# Patient Record
(demographics unavailable — no encounter records)

---

## 2019-06-14 NOTE — EDM.PDOC
ED HPI GENERAL MEDICAL PROBLEM





- General


Chief Complaint: General


Stated Complaint: AMBULANCE


Time Seen by Provider: 06/14/19 07:35


Source of Information: Reports: Patient


History Limitations: Reports: No Limitations





- History of Present Illness


INITIAL COMMENTS - FREE TEXT/NARRATIVE: 





This 70 yo female patient was brought to the ED by [facility] due to altered mentation

, tachycardia and shortness of breath. EMS reports the patient was reporting 

that the person in the apartment next to her was having x-rays. The patient 

reports she did start Chantix 2 weeks ago. The patient was having a difficult 

time recalling information she would normally know. 


Onset: Today


Duration: Constant


Location: Reports: Other


Quality: Reports: Other


Severity: Severe


Improves with: Reports: None


Worsens with: Reports: None


Associated Symptoms: Reports: Confusion





- Related Data


 Allergies











Allergy/AdvReac Type Severity Reaction Status Date / Time


 


amoxicillin trihydrate Allergy  Nausea Verified 06/14/19 08:48





[From Augmentin]     


 


potassium clavulanate Allergy  Nausea Verified 06/14/19 08:48





[From Augmentin]     











Home Meds: 


 Home Meds





Amitriptyline [Elavil] 6 tab PO BEDTIME 12/15/14 [History]


Aspirin [Children's Aspirin] 1 tab PO DAILY 12/15/14 [History]


Calcium Citrate/Vitamin D3 [Calcium Citrate with D Tablet] 1 tab PO DAILY 12/15/

14 [History]


Estrogens, Conjugated [Premarin] 0.625 mg PO DAILY 12/15/14 [History]


Levothyroxine Sodium [Synthroid] 112 mcg PO DAILY 12/15/14 [History]


Lisinopril 40 mg PO DAILY 12/15/14 [History]


Meloxicam [Mobic] 1 tab PO DAILY 12/15/14 [History]


Multivitamin [Daily Multiple Vitamin] 1 tab PO DAILY 12/15/14 [History]


Ferrous Sulfate 325 mg PO BIDM 01/14/15 [History]


Lisinopril 20 mg PO DAILY 01/14/15 [History]


Mesalamine [Delzicol] 2 tab PO TID 01/14/15 [History]


Morphine Sulfate 15 mg PO ASDIRECTED 01/14/15 [History]


Pantoprazole [ProTONIX***] 40 mg PO DAILY 01/14/15 [History]


predniSONE 20 mg PO BID 01/14/15 [History]


Dicyclomine [Bentyl] 10 mg PO TID #30 cap 01/16/15 [Rx]


Morphine Sulfate [Morphine Sulfate ER] 2 tab PO BID #1 tablet.er 01/16/15 [Rx]











ED ROS GENERAL





- Review of Systems


Review Of Systems: ROS reveals no pertinent complaints other than HPI.





ED EXAM, GENERAL





- Physical Exam


Exam: See Below


Exam Limited By: No Limitations


General Appearance: Alert, Anxious, Moderate Distress, Thin


Eye Exam: Bilateral Eye: EOMI, Normal Inspection, PERRL


Ears: Normal External Exam, Normal Canal, Hearing Grossly Normal, Normal TMs


Nose: Normal Inspection, Normal Mucosa, No Blood


Throat/Mouth: Normal Inspection, Normal Lips, Normal Teeth, Normal Gums, Normal 

Oropharynx, Normal Voice, No Airway Compromise


Head: Atraumatic, Normocephalic


Neck: Normal Inspection, Supple, Non-Tender, Full Range of Motion


Respiratory/Chest: No Respiratory Distress, Lungs Clear, Normal Breath Sounds, 

No Accessory Muscle Use, Chest Non-Tender


Cardiovascular: No Edema, No Gallop, No JVD, No Murmur, No Rub, Tachycardia


GI/Abdominal: Normal Bowel Sounds, Soft, Non-Tender, No Organomegaly, No 

Distention, No Abnormal Bruit, No Mass


 (Female) Exam: Deferred


Rectal (Female) Exam: Deferred


Back Exam: Normal Inspection, Full Range of Motion, NT


Extremities: Normal Inspection, Normal Range of Motion, Non-Tender, Normal 

Capillary Refill, No Pedal Edema


Neurological: Confused, Disoriented


Psychiatric: Anxious


Skin Exam: Warm, Dry, Intact, Normal Color, No Rash


Lymphatic: No Adenopathy





Course





- Vital Signs


Last Recorded V/S: 


 Last Vital Signs











Temp  36.6 C   06/14/19 07:47


 


Pulse  104 H  06/14/19 07:47


 


Resp  20   06/14/19 07:47


 


BP  129/74   06/14/19 07:47


 


Pulse Ox  90 L  06/14/19 07:47














- Orders/Labs/Meds


Orders: 


 Active Orders 24 hr











 Category Date Time Status


 


 EKG Documentation Completion [RC] URGENT Care  06/14/19 07:15 Active


 


 Sodium Chloride 0.9% [Normal Saline] 1,000 ml Med  06/14/19 08:52 Ordered





 IV .BOLUS   








 Medication Orders





Sodium Chloride (Normal Saline)  1,000 mls @ 500 mls/hr IV .BOLUS ONE


   Stop: 06/14/19 10:51








Labs: 


 Laboratory Tests











  06/14/19 06/14/19 06/14/19 Range/Units





  07:36 07:36 07:36 


 


WBC     (5.0-10.0)  10^3/uL


 


RBC     (4.2-5.4)  10^6/uL


 


Hgb     (12.0-16.0)  g/dL


 


Hct     (37.0-47.0)  %


 


MCV     ()  fL


 


MCH     (27.0-34.0)  pg


 


MCHC     (33.0-35.0)  g/dL


 


Plt Count     (150-450)  10^3/uL


 


Neut % (Auto)     (42.2-75.2)  %


 


Lymph % (Auto)     (20.5-50.1)  %


 


Mono % (Auto)     (2-8)  %


 


Eos % (Auto)     (1.0-3.0)  %


 


Baso % (Auto)     (0.0-1.0)  %


 


Sodium     (135-145)  mmol/L


 


Potassium     (3.6-5.0)  mmol/L


 


Chloride     (101-111)  mmol/L


 


Carbon Dioxide     (21.0-31.0)  mmol/L


 


Anion Gap     


 


BUN     (7-18)  mg/dL


 


Creatinine     (0.6-1.3)  mg/dL


 


Est Cr Clr Drug Dosing     mL/min


 


Estimated GFR (MDRD)     


 


BUN/Creatinine Ratio     


 


Glucose     ()  mg/dL


 


Lactic Acid    1.3  (0.5-2.2)  mmol/L


 


Calcium     (8.4-10.2)  mg/dl


 


Magnesium  2.4    (1.8-2.5)  mg/dL


 


Total Bilirubin     (0.2-1.0)  mg/dL


 


AST     (10-42)  IU/L


 


ALT     (10-60)  IU/L


 


Alkaline Phosphatase     ()  IU/L


 


Ammonia   18   (11-35)  umol/L


 


Troponin I     (0.00-0.02)  ng/ml


 


Total Protein     (6.7-8.2)  g/dl


 


Albumin     (3.2-5.5)  g/dl


 


Globulin     


 


Albumin/Globulin Ratio     


 


Lipase  22    (22-51)  U/L


 


Urine Color     (YELLOW)  


 


Urine Appearance     (CLEAR)  


 


Urine pH     (5.0-9.0)  


 


Ur Specific Gravity     (1.005-1.030)  


 


Urine Protein     (NEGATIVE)  


 


Urine Glucose (UA)     (NEGATIVE)  


 


Urine Ketones     (NEGATIVE)  


 


Urine Occult Blood     (NEGATIVE)  


 


Urine Nitrite     (NEGATIVE)  


 


Urine Bilirubin     (NEGATIVE)  


 


Urine Urobilinogen     (0.2-1.0)  mg/dL


 


Ur Leukocyte Esterase     (NEGATIVE)  


 


Urine RBC     /HPF


 


Urine WBC     (0-5/HPF)  /HPF


 


Ur Epithelial Cells     (NOT SEEN)  /HPF


 


Amorphous Sediment     (NOT SEEN)  /HPF


 


Urine Bacteria     (0-FEW/HPF)  /HPF


 


Hyaline Casts     (NOT SEEN)  /LPF


 


Fine Granular Casts     (NOT SEEN)  /LPF


 


Urine Mucus     (NOT SEEN)  /LPF


 


Salicylates     mg/dL


 


Urine Opiates Screen     (NEGATIVE)  


 


Ur Oxycodone Screen     (NEGATIVE)  


 


Urine Methadone Screen     (NEGATIVE)  


 


Acetaminophen  < 10    ug/mL


 


Ur Barbiturates Screen     (NEGATIVE)  


 


U Tricyclic Antidepress     (NEGATIVE)  


 


Ur Phencyclidine Scrn     (NEGATIVE)  


 


Ur Amphetamine Screen     (NEGATIVE)  


 


U Methamphetamines Scrn     (NEGATIVE)  


 


Urine MDMA Screen     (NEGATIVE)  


 


U Benzodiazepines Scrn     (NEGATIVE)  


 


Urine Cocaine Screen     (NEGATIVE)  


 


U Marijuana (THC) Screen     (NEGATIVE)  


 


Ethyl Alcohol  < 5    mg/dL














  06/14/19 06/14/19 06/14/19 Range/Units





  07:36 07:36 07:58 


 


WBC  18.0 H    (5.0-10.0)  10^3/uL


 


RBC  4.48    (4.2-5.4)  10^6/uL


 


Hgb  14.1  D    (12.0-16.0)  g/dL


 


Hct  42.1    (37.0-47.0)  %


 


MCV  94.0  D    ()  fL


 


MCH  31.5    (27.0-34.0)  pg


 


MCHC  33.5    (33.0-35.0)  g/dL


 


Plt Count  262  D    (150-450)  10^3/uL


 


Neut % (Auto)  87.5 H    (42.2-75.2)  %


 


Lymph % (Auto)  6.5 L    (20.5-50.1)  %


 


Mono % (Auto)  5.8    (2-8)  %


 


Eos % (Auto)  0.1 L    (1.0-3.0)  %


 


Baso % (Auto)  0.1    (0.0-1.0)  %


 


Sodium   129 L   (135-145)  mmol/L


 


Potassium   4.7   (3.6-5.0)  mmol/L


 


Chloride   92 L   (101-111)  mmol/L


 


Carbon Dioxide   24.0   (21.0-31.0)  mmol/L


 


Anion Gap   17.7   


 


BUN   34 H   (7-18)  mg/dL


 


Creatinine   2.3 H   (0.6-1.3)  mg/dL


 


Est Cr Clr Drug Dosing   9.62   mL/min


 


Estimated GFR (MDRD)   21   


 


BUN/Creatinine Ratio   14.78   


 


Glucose   93   ()  mg/dL


 


Lactic Acid     (0.5-2.2)  mmol/L


 


Calcium   9.4   (8.4-10.2)  mg/dl


 


Magnesium     (1.8-2.5)  mg/dL


 


Total Bilirubin   0.7   (0.2-1.0)  mg/dL


 


AST   256 H   (10-42)  IU/L


 


ALT   92 H   (10-60)  IU/L


 


Alkaline Phosphatase   106   ()  IU/L


 


Ammonia     (11-35)  umol/L


 


Troponin I   0.02   (0.00-0.02)  ng/ml


 


Total Protein   6.6 L   (6.7-8.2)  g/dl


 


Albumin   3.4   (3.2-5.5)  g/dl


 


Globulin   3.2   


 


Albumin/Globulin Ratio   1.06   


 


Lipase     (22-51)  U/L


 


Urine Color    Dark yellow  (YELLOW)  


 


Urine Appearance    Slightly cloudy  (CLEAR)  


 


Urine pH    5.0  (5.0-9.0)  


 


Ur Specific Gravity    1.020  (1.005-1.030)  


 


Urine Protein    30 H  (NEGATIVE)  


 


Urine Glucose (UA)    Negative  (NEGATIVE)  


 


Urine Ketones    Trace H  (NEGATIVE)  


 


Urine Occult Blood    Moderate H  (NEGATIVE)  


 


Urine Nitrite    Negative  (NEGATIVE)  


 


Urine Bilirubin    Small H  (NEGATIVE)  


 


Urine Urobilinogen    0.2  (0.2-1.0)  mg/dL


 


Ur Leukocyte Esterase    Negative  (NEGATIVE)  


 


Urine RBC    0-5  /HPF


 


Urine WBC    0-5  (0-5/HPF)  /HPF


 


Ur Epithelial Cells    Moderate H  (NOT SEEN)  /HPF


 


Amorphous Sediment    Occasional  (NOT SEEN)  /HPF


 


Urine Bacteria    Few  (0-FEW/HPF)  /HPF


 


Hyaline Casts    Occasional H  (NOT SEEN)  /LPF


 


Fine Granular Casts    Few H  (NOT SEEN)  /LPF


 


Urine Mucus    Occasional  (NOT SEEN)  /LPF


 


Salicylates   < 4   mg/dL


 


Urine Opiates Screen     (NEGATIVE)  


 


Ur Oxycodone Screen     (NEGATIVE)  


 


Urine Methadone Screen     (NEGATIVE)  


 


Acetaminophen     ug/mL


 


Ur Barbiturates Screen     (NEGATIVE)  


 


U Tricyclic Antidepress     (NEGATIVE)  


 


Ur Phencyclidine Scrn     (NEGATIVE)  


 


Ur Amphetamine Screen     (NEGATIVE)  


 


U Methamphetamines Scrn     (NEGATIVE)  


 


Urine MDMA Screen     (NEGATIVE)  


 


U Benzodiazepines Scrn     (NEGATIVE)  


 


Urine Cocaine Screen     (NEGATIVE)  


 


U Marijuana (THC) Screen     (NEGATIVE)  


 


Ethyl Alcohol     mg/dL














  06/14/19 Range/Units





  07:58 


 


WBC   (5.0-10.0)  10^3/uL


 


RBC   (4.2-5.4)  10^6/uL


 


Hgb   (12.0-16.0)  g/dL


 


Hct   (37.0-47.0)  %


 


MCV   ()  fL


 


MCH   (27.0-34.0)  pg


 


MCHC   (33.0-35.0)  g/dL


 


Plt Count   (150-450)  10^3/uL


 


Neut % (Auto)   (42.2-75.2)  %


 


Lymph % (Auto)   (20.5-50.1)  %


 


Mono % (Auto)   (2-8)  %


 


Eos % (Auto)   (1.0-3.0)  %


 


Baso % (Auto)   (0.0-1.0)  %


 


Sodium   (135-145)  mmol/L


 


Potassium   (3.6-5.0)  mmol/L


 


Chloride   (101-111)  mmol/L


 


Carbon Dioxide   (21.0-31.0)  mmol/L


 


Anion Gap   


 


BUN   (7-18)  mg/dL


 


Creatinine   (0.6-1.3)  mg/dL


 


Est Cr Clr Drug Dosing   mL/min


 


Estimated GFR (MDRD)   


 


BUN/Creatinine Ratio   


 


Glucose   ()  mg/dL


 


Lactic Acid   (0.5-2.2)  mmol/L


 


Calcium   (8.4-10.2)  mg/dl


 


Magnesium   (1.8-2.5)  mg/dL


 


Total Bilirubin   (0.2-1.0)  mg/dL


 


AST   (10-42)  IU/L


 


ALT   (10-60)  IU/L


 


Alkaline Phosphatase   ()  IU/L


 


Ammonia   (11-35)  umol/L


 


Troponin I   (0.00-0.02)  ng/ml


 


Total Protein   (6.7-8.2)  g/dl


 


Albumin   (3.2-5.5)  g/dl


 


Globulin   


 


Albumin/Globulin Ratio   


 


Lipase   (22-51)  U/L


 


Urine Color   (YELLOW)  


 


Urine Appearance   (CLEAR)  


 


Urine pH   (5.0-9.0)  


 


Ur Specific Gravity   (1.005-1.030)  


 


Urine Protein   (NEGATIVE)  


 


Urine Glucose (UA)   (NEGATIVE)  


 


Urine Ketones   (NEGATIVE)  


 


Urine Occult Blood   (NEGATIVE)  


 


Urine Nitrite   (NEGATIVE)  


 


Urine Bilirubin   (NEGATIVE)  


 


Urine Urobilinogen   (0.2-1.0)  mg/dL


 


Ur Leukocyte Esterase   (NEGATIVE)  


 


Urine RBC   /HPF


 


Urine WBC   (0-5/HPF)  /HPF


 


Ur Epithelial Cells   (NOT SEEN)  /HPF


 


Amorphous Sediment   (NOT SEEN)  /HPF


 


Urine Bacteria   (0-FEW/HPF)  /HPF


 


Hyaline Casts   (NOT SEEN)  /LPF


 


Fine Granular Casts   (NOT SEEN)  /LPF


 


Urine Mucus   (NOT SEEN)  /LPF


 


Salicylates   mg/dL


 


Urine Opiates Screen  Positive H  (NEGATIVE)  


 


Ur Oxycodone Screen  Positive H  (NEGATIVE)  


 


Urine Methadone Screen  Negative  (NEGATIVE)  


 


Acetaminophen   ug/mL


 


Ur Barbiturates Screen  Negative  (NEGATIVE)  


 


U Tricyclic Antidepress  Positive H  (NEGATIVE)  


 


Ur Phencyclidine Scrn  Negative  (NEGATIVE)  


 


Ur Amphetamine Screen  Negative  (NEGATIVE)  


 


U Methamphetamines Scrn  Negative  (NEGATIVE)  


 


Urine MDMA Screen  Negative  (NEGATIVE)  


 


U Benzodiazepines Scrn  Negative  (NEGATIVE)  


 


Urine Cocaine Screen  Negative  (NEGATIVE)  


 


U Marijuana (THC) Screen  Negative  (NEGATIVE)  


 


Ethyl Alcohol   mg/dL











Meds: 


Medications











Generic Name Dose Route Start Last Admin





  Trade Name Freq  PRN Reason Stop Dose Admin


 


Sodium Chloride  1,000 mls @ 500 mls/hr  06/14/19 08:52  





  Normal Saline  IV  06/14/19 10:51  





  .BOLUS ONE   





     





     





     





     














Departure





- Departure


Time of Disposition: 08:56


Disposition: DC/Tfer to [facility]


Condition: Serious


Clinical Impression: 


Altered mental status, unspecified


Qualifiers:


 Altered mental status type: unspecified Qualified Code(s): R41.82 - Altered 

mental status, unspecified





Acute renal failure


Qualifiers:


 Acute renal failure type: unspecified Qualified Code(s): N17.9 - Acute kidney 

failure, unspecified








- Discharge Information


*PRESCRIPTION DRUG MONITORING PROGRAM REVIEWED*: Not Applicable


*COPY OF PRESCRIPTION DRUG MONITORING REPORT IN PATIENT ELAINA: Not Applicable


Forms:  Interfacility Transfer EMTALA


Care Plan Goals: 


Discussed the patient's history, examination, lab, EKG, x-ray and CT results 

with Dr. Sarkar (Hospitalist with [facility] in [place]). Dr. Sarkar 

accepted the patient for continued evaluation and further management as an 

inpatient at [facility] in [place]. The patient will be transported by [facility]. 





- My Orders


Last 24 Hours: 


My Active Orders





06/14/19 07:15


EKG Documentation Completion [RC] URGENT 





06/14/19 08:52


Sodium Chloride 0.9% [Normal Saline] 1,000 ml IV .BOLUS 














- Assessment/Plan


Last 24 Hours: 


My Active Orders





06/14/19 07:15


EKG Documentation Completion [RC] URGENT 





06/14/19 08:52


Sodium Chloride 0.9% [Normal Saline] 1,000 ml IV .BOLUS

## 2019-07-02 NOTE — EDM.PDOC
ED HPI GENERAL MEDICAL PROBLEM





- General


Chief Complaint: General


Stated Complaint: [facility]


Time Seen by Provider: 19 10:07


Source of Information: Reports: Patient, EMS, Old Records, RN, RN Notes Reviewed





- History of Present Illness


INITIAL COMMENTS - FREE TEXT/NARRATIVE: 


Pt presents to ER from home by ambulance with c/o 4 or 5 days duration of 

generalized weakness, loss of appetite, mild but recurrent headaches, mild 

shortness of breath, and generalized malaise. Pt states she has eaten nothing 

but small amounts of Ramen noodles for the past 5 days. She denies fevers, 

chills, chest pain, abdominal pain, cough, edema, orthopnea, hemoptysis, 

confusion, visual changes, neck pain or stiffness, joint pain or swelling, sore 

throat, rash, nausea, vomiting, diarrhea, constipation, or dysuria. EMS crew 

reports pt's apartment was "overwhelmingly hot" and smelled heavily of 

cigarette smoke.





Onset: Gradual


Duration: Day(s): (4-5), Constant


Location: Reports: Chest, Generalized


Quality: Reports: Other (Denies pain)


Severity: Severe


Improves with: Reports: None


Worsens with: Reports: None


Associated Symptoms: Reports: No Other Symptoms





- Related Data


 Allergies











Allergy/AdvReac Type Severity Reaction Status Date / Time


 


amoxicillin trihydrate Allergy  Nausea Verified 19 10:25





[From Augmentin]     


 


potassium clavulanate Allergy  Nausea Verified 19 10:25





[From Augmentin]     











Home Meds: 


 Home Meds





Amitriptyline [Elavil] 6 tab PO BEDTIME 12/15/14 [History]


Aspirin [Children's Aspirin] 1 tab PO DAILY 12/15/14 [History]


Calcium Citrate/Vitamin D3 [Calcium Citrate with D Tablet] 1 tab PO DAILY 12/15/

14 [History]


Estrogens, Conjugated [Premarin] 0.625 mg PO DAILY 12/15/14 [History]


Levothyroxine Sodium [Synthroid] 112 mcg PO DAILY 12/15/14 [History]


Lisinopril 40 mg PO DAILY 12/15/14 [History]


Meloxicam [Mobic] 1 tab PO DAILY 12/15/14 [History]


Multivitamin [Daily Multiple Vitamin] 1 tab PO DAILY 12/15/14 [History]


Ferrous Sulfate 325 mg PO BIDM 01/14/15 [History]


Lisinopril 20 mg PO DAILY 01/14/15 [History]


Mesalamine [Delzicol] 2 tab PO TID 01/14/15 [History]


Morphine Sulfate 15 mg PO ASDIRECTED 01/14/15 [History]


Pantoprazole [ProTONIX***] 40 mg PO DAILY 01/14/15 [History]


predniSONE 20 mg PO BID 01/14/15 [History]


Dicyclomine [Bentyl] 10 mg PO TID #30 cap 01/16/15 [Rx]


Morphine Sulfate [Morphine Sulfate ER] 2 tab PO BID #1 tablet.er 01/16/15 [Rx]











Past Medical History


Respiratory History: Reports: COPD, Other (See Below) (Chronic tobacco smoking 

dependence)


Gastrointestinal History: Reports: Cholelithiasis


Other Gastrointestinal History: elevated LFTs


Endocrine/Metabolic History: Reports: Hypothyroidism


Hematologic History: Reports: Anemia, Other (See Below)


Other Hematologic History: iron deficiency anemia





- Past Surgical History


Musculoskeletal Surgical History: Reports: Hip Replacement, Other (See Below)


Other Musculoskeletal Surgeries/Procedures:: rt tka





Social & Family History





- Family History


Family Medical History: Noncontributory





- Tobacco Use


Smoking Status *Q: Current Every Day Smoker


Tobacco Use Within Last Twelve Months: Cigarettes





- Alcohol Use


Alcohol Use History: Yes


Alcohol Use Frequency: Rarely





- Recreational Drug Use


Recreational Drug Use: No





- Living Situation & Occupation


Living situation: Reports: Single, Alone


Occupation: Retired





ED ROS GENERAL





- Review of Systems


Review Of Systems: ROS reveals no pertinent complaints other than HPI.





ED EXAM, GENERAL





- Physical Exam


Exam: See Below


Exam Limited By: No Limitations


General Appearance: Alert, No Apparent Distress, Thin


Eye Exam: Bilateral Eye: EOMI, Normal Inspection, PERRL


Ears: Normal External Exam, Hearing Grossly Normal


Nose: Normal Inspection, Normal Mucosa, No Blood


Throat/Mouth: Normal Lips, Normal Teeth, Normal Gums, Normal Oropharynx, Normal 

Voice, No Airway Compromise, Other (Dry oral membranes)


Head: Atraumatic, Normocephalic


Neck: Normal Inspection, Supple, Non-Tender, Full Range of Motion.  No: 

Lymphadenopathy (L), Lymphadenopathy (R)


Respiratory/Chest: No Respiratory Distress, No Accessory Muscle Use, Chest Non-

Tender, Decreased Breath Sounds, Other (Coarse breath sounds).  No: Rales, 

Rhonchi, Wheezing


Cardiovascular: Regular Rate, Rhythm, No Edema, Tachycardia


GI/Abdominal: Normal Bowel Sounds, Soft, Non-Tender, No Distention, No Abnormal 

Bruit, No Mass.  No: Guarding, Rigid, Rebound


 (Female) Exam: Deferred


Rectal (Female) Exam: Deferred


Back Exam: Normal Inspection, Full Range of Motion.  No: CVA Tenderness (L), 

CVA Tenderness (R)


Extremities: Normal Inspection, Normal Range of Motion, Non-Tender, Normal 

Capillary Refill, No Pedal Edema


Neurological: Alert, Oriented, CN II-XII Intact, Normal Cognition, Normal Gait, 

No Motor/Sensory Deficits


Psychiatric: Depressed Mood, Flat Affect


Skin Exam: Warm, Dry, Intact, Normal Color, No Rash





EKG INTERPRETATION


EKG Date: 19


Time: 10:24


Rhythm: Other (SR)


Rate (Beats/Min): 95


Axis: LAD-Left Axis Deviation


P-Wave: Present


QRS: Other (Q-waves in inferior and lateral leads)


ST-T: Normal


QT: Normal


Comparison: Change From Previous EKG (Q waves not present in lateral leads on 

previous EKG.)





Course





- Vital Signs


Last Recorded V/S: 


 Last Vital Signs











Temp  98.4 F   19 10:25


 


Pulse  100   19 10:25


 


Resp  16   19 10:25


 


BP  127/92 H  19 10:25


 


Pulse Ox  99   19 10:25














- Orders/Labs/Meds


Orders: 


 Active Orders 24 hr











 Category Date Time Status


 


 EKG 12 Lead [EKG Documentation Completion] [RC] STAT Care  19 10:14 

Active











Labs: 


 Laboratory Tests











  19 Range/Units





  10:30 10:30 10:30 


 


WBC  6.3    (5.0-10.0)  10^3/uL


 


RBC  4.52    (4.2-5.4)  10^6/uL


 


Hgb  13.9    (12.0-16.0)  g/dL


 


Hct  40.9    (37.0-47.0)  %


 


MCV  90.5  D    ()  fL


 


MCH  30.8    (27.0-34.0)  pg


 


MCHC  34.0    (33.0-35.0)  g/dL


 


Plt Count  294    (150-450)  10^3/uL


 


Neut % (Auto)  75.4 H    (42.2-75.2)  %


 


Lymph % (Auto)  13.2 L    (20.5-50.1)  %


 


Mono % (Auto)  10.2 H    (2-8)  %


 


Eos % (Auto)  1.0    (1.0-3.0)  %


 


Baso % (Auto)  0.2    (0.0-1.0)  %


 


Sodium   133 L   (135-145)  mmol/L


 


Potassium   3.5 L   (3.6-5.0)  mmol/L


 


Chloride   98 L   (101-111)  mmol/L


 


Carbon Dioxide   25.0   (21.0-31.0)  mmol/L


 


Anion Gap   13.5   


 


BUN   20 H   (7-18)  mg/dL


 


Creatinine   0.9  D   (0.6-1.3)  mg/dL


 


Est Cr Clr Drug Dosing   42.38   mL/min


 


Estimated GFR (MDRD)   > 60   


 


BUN/Creatinine Ratio   22.22   


 


Glucose   107 H   ()  mg/dL


 


Lactic Acid    1.0  (0.5-2.2)  mmol/L


 


Calcium   8.6   (8.4-10.2)  mg/dl


 


Magnesium   1.9   (1.8-2.5)  mg/dL


 


Total Bilirubin   0.8   (0.2-1.0)  mg/dL


 


AST   52 H   (10-42)  IU/L


 


ALT   47   (10-60)  IU/L


 


Alkaline Phosphatase   84   ()  IU/L


 


Troponin I   < 0.02   (0.00-0.02)  ng/ml


 


Total Protein   6.4 L   (6.7-8.2)  g/dl


 


Albumin   3.1 L   (3.2-5.5)  g/dl


 


Globulin   3.3   


 


Albumin/Globulin Ratio   0.94   


 


Amylase   63   ()  U/L


 


Lipase   48   (22-51)  U/L


 


TSH, Ultra Sensitive     (0.45-5.33)  uIu/mL


 


Urine Color     (YELLOW)  


 


Urine Appearance     (CLEAR)  


 


Urine pH     (5.0-9.0)  


 


Ur Specific Gravity     (1.005-1.030)  


 


Urine Protein     (NEGATIVE)  


 


Urine Glucose (UA)     (NEGATIVE)  


 


Urine Ketones     (NEGATIVE)  


 


Urine Occult Blood     (NEGATIVE)  


 


Urine Nitrite     (NEGATIVE)  


 


Urine Bilirubin     (NEGATIVE)  


 


Urine Urobilinogen     (0.2-1.0)  mg/dL


 


Ur Leukocyte Esterase     (NEGATIVE)  


 


Urine Opiates Screen     (NEGATIVE)  


 


Ur Oxycodone Screen     (NEGATIVE)  


 


Urine Methadone Screen     (NEGATIVE)  


 


Ur Barbiturates Screen     (NEGATIVE)  


 


U Tricyclic Antidepress     (NEGATIVE)  


 


Ur Phencyclidine Scrn     (NEGATIVE)  


 


Ur Amphetamine Screen     (NEGATIVE)  


 


U Methamphetamines Scrn     (NEGATIVE)  


 


Urine MDMA Screen     (NEGATIVE)  


 


U Benzodiazepines Scrn     (NEGATIVE)  


 


Urine Cocaine Screen     (NEGATIVE)  


 


U Marijuana (THC) Screen     (NEGATIVE)  


 


Ethyl Alcohol   < 5   mg/dL














  19 Range/Units





  10:30 11:00 11:00 


 


WBC     (5.0-10.0)  10^3/uL


 


RBC     (4.2-5.4)  10^6/uL


 


Hgb     (12.0-16.0)  g/dL


 


Hct     (37.0-47.0)  %


 


MCV     ()  fL


 


MCH     (27.0-34.0)  pg


 


MCHC     (33.0-35.0)  g/dL


 


Plt Count     (150-450)  10^3/uL


 


Neut % (Auto)     (42.2-75.2)  %


 


Lymph % (Auto)     (20.5-50.1)  %


 


Mono % (Auto)     (2-8)  %


 


Eos % (Auto)     (1.0-3.0)  %


 


Baso % (Auto)     (0.0-1.0)  %


 


Sodium     (135-145)  mmol/L


 


Potassium     (3.6-5.0)  mmol/L


 


Chloride     (101-111)  mmol/L


 


Carbon Dioxide     (21.0-31.0)  mmol/L


 


Anion Gap     


 


BUN     (7-18)  mg/dL


 


Creatinine     (0.6-1.3)  mg/dL


 


Est Cr Clr Drug Dosing     mL/min


 


Estimated GFR (MDRD)     


 


BUN/Creatinine Ratio     


 


Glucose     ()  mg/dL


 


Lactic Acid     (0.5-2.2)  mmol/L


 


Calcium     (8.4-10.2)  mg/dl


 


Magnesium     (1.8-2.5)  mg/dL


 


Total Bilirubin     (0.2-1.0)  mg/dL


 


AST     (10-42)  IU/L


 


ALT     (10-60)  IU/L


 


Alkaline Phosphatase     ()  IU/L


 


Troponin I     (0.00-0.02)  ng/ml


 


Total Protein     (6.7-8.2)  g/dl


 


Albumin     (3.2-5.5)  g/dl


 


Globulin     


 


Albumin/Globulin Ratio     


 


Amylase     ()  U/L


 


Lipase     (22-51)  U/L


 


TSH, Ultra Sensitive  1.15    (0.45-5.33)  uIu/mL


 


Urine Color   Yellow   (YELLOW)  


 


Urine Appearance   Clear   (CLEAR)  


 


Urine pH   6.5   (5.0-9.0)  


 


Ur Specific Gravity   1.010   (1.005-1.030)  


 


Urine Protein   Negative   (NEGATIVE)  


 


Urine Glucose (UA)   Negative   (NEGATIVE)  


 


Urine Ketones   Negative   (NEGATIVE)  


 


Urine Occult Blood   Negative   (NEGATIVE)  


 


Urine Nitrite   Negative   (NEGATIVE)  


 


Urine Bilirubin   Negative   (NEGATIVE)  


 


Urine Urobilinogen   0.2   (0.2-1.0)  mg/dL


 


Ur Leukocyte Esterase   Negative   (NEGATIVE)  


 


Urine Opiates Screen    Positive H  (NEGATIVE)  


 


Ur Oxycodone Screen    Negative  (NEGATIVE)  


 


Urine Methadone Screen    Negative  (NEGATIVE)  


 


Ur Barbiturates Screen    Negative  (NEGATIVE)  


 


U Tricyclic Antidepress    Positive H  (NEGATIVE)  


 


Ur Phencyclidine Scrn    Negative  (NEGATIVE)  


 


Ur Amphetamine Screen    Negative  (NEGATIVE)  


 


U Methamphetamines Scrn    Negative  (NEGATIVE)  


 


Urine MDMA Screen    Negative  (NEGATIVE)  


 


U Benzodiazepines Scrn    Negative  (NEGATIVE)  


 


Urine Cocaine Screen    Negative  (NEGATIVE)  


 


U Marijuana (THC) Screen    Negative  (NEGATIVE)  


 


Ethyl Alcohol     mg/dL











Meds: 


Medications














Discontinued Medications














Generic Name Dose Route Start Last Admin





  Trade Name Abiola  PRN Reason Stop Dose Admin


 


Acetaminophen  650 mg  19 10:37  19 10:51





  Tylenol  PO  19 10:38  650 mg





  NOW ONE   Administration





     





     





     





     














- Radiology Interpretation


Free Text/Narrative:: 


[facility]


Final Radiology Report  Call: [phone]


assistance Online chat: [email or web address]


Name: ARMANDO SPAIN Age: 69Years F Date: 2019


MRN: [id number] SSN: -- : [id number]


Study: XR CHEST 2 VIEWS FRONTAL & LAT Requesting Physician: ELENITA KIMBALL


Accession: [id number] Images: 2


Addl Studies:


Provided Clinical History:


Contrast: Contrast Medium:


Contrast Amount: Contrast Method:


CONFIDENTIALITY STATEMENT


This report is intended only for use by the referring physician, and only in 

accordance with law. If you received this in error, call [phone].


Page 1 of 1


EXAM:


XR Chest, 2 Views


EXAM DATE/TIME:


2019 10:40 AM


CLINICAL HISTORY:


69 years old, female; Cough and dyspnea


TECHNIQUE:


Imaging protocol: XR of the chest, 2 views.


COMPARISON:


CR Chest 1V Frontal 2019 7:33 AM


FINDINGS:


Lungs: No focal peripheral lung consolidation, air bronchogram formation, or 

silhouette sign.


Pleural space: No pleural effusion or pneumothorax.


Heart/Mediastinum: The heart is not enlarged.


Vasculature: The thoracic aorta is tortuous.


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 

Prior posterior lumbar


spine fusion surgery.


IMPRESSION:


No pneumonia.


Thank you for allowing us to participate in the care of your patient.


Dictated and Authenticated by: Saturnino Thompson MD


2019 11:16 AM Central Time ([place] & [place])








- Re-Assessments/Exams


Free Text/Narrative Re-Assessment/Exam: 





19 11:49


Pt reports feeling much better after receiving IVF hydration, and wishes to be d

/c'd home.





Departure





- Departure


Time of Disposition: 11:50


Disposition: Home, Self-Care 01


Condition: Good


Clinical Impression: 


 Dehydration





Acute headache


Qualifiers:


 Headache type: unspecified Intractability: not intractable Qualified Code(s): 

R51 - Headache








- Discharge Information


*PRESCRIPTION DRUG MONITORING PROGRAM REVIEWED*: No


*COPY OF PRESCRIPTION DRUG MONITORING REPORT IN PATIENT ELAINA: No


Instructions:  Dehydration, Adult


Forms:  ED Department Discharge


Additional Instructions: 


Keep your house from getting too hot.


Drink plenty of water.


Follow up in clinic if not completely improved in 2 to 3 days.


Return to ER if worse at any time.





- My Orders


Last 24 Hours: 


My Active Orders





19 10:14


EKG 12 Lead [EKG Documentation Completion] [RC] STAT 














- Assessment/Plan


Last 24 Hours: 


My Active Orders





19 10:14


EKG 12 Lead [EKG Documentation Completion] [RC] STAT

## 2020-05-29 NOTE — PCM.HP
H&P History of Present Illness





- General


Date of Service: 05/29/20


Admit Problem/Dx: 


 Admission Diagnosis/Problem





Admission Diagnosis/Problem      Falls








Source of Information: Patient, Provider (ER)





- History of Present Illness


Initial Comments - Free Text/Narative: 





70-year-old lady with a history of hypothyroidism, hypertension.


The patient has had prior hip surgery and has 1 inch discrepancy between lower 

extremity lengths. She also has advanced arthritis.


The patient fell early in the morning on the day of admission. She was unable 

to get up 4-3 hours. She was crawling on the floor. Developed multiple bruises. 

Came to the emergency room.


She denies loss of consciousness, no headache, chest pain, shortness of breath.


She has been complaining of weakness for a few days duration. No new  

medication.





- Related Data


Allergies/Adverse Reactions: 


 Allergies











Allergy/AdvReac Type Severity Reaction Status Date / Time


 


amoxicillin trihydrate Allergy  Nausea Verified 05/29/20 20:08





[From Augmentin]     


 


potassium clavulanate Allergy  Nausea Verified 05/29/20 20:08





[From Augmentin]     


 


codeine AdvReac Mild Vomiting Verified 05/29/20 20:08











Home Medications: 


 Home Meds





Amitriptyline [Elavil] 300 mg PO BEDTIME 12/15/14 [History]


Calcium Citrate/Vitamin D3 [Calcium Citrate with D Tablet] 1 tab PO DAILY 12/15/

14 [History]


Estrogens, Conjugated [Premarin] 0.625 mg PO DAILY 12/15/14 [History]


Levothyroxine Sodium [Synthroid] 112 mcg PO DAILY 12/15/14 [History]


Lisinopril 40 mg PO DAILY 12/15/14 [History]


Multivitamin [Daily Multiple Vitamin] 1 tab PO DAILY 12/15/14 [History]


Lisinopril 20 mg PO DAILY 01/14/15 [History]


Pantoprazole [ProTONIX***] 40 mg PO DAILY 01/14/15 [History]


Dicyclomine [Bentyl] 10 mg PO BID 05/29/20 [History]











Past Medical History


Respiratory History: Reports: COPD, Other (See Below) (Chronic tobacco smoking 

dependence)


Gastrointestinal History: Reports: Cholelithiasis


Other Gastrointestinal History: elevated LFTs


Endocrine/Metabolic History: Reports: Hypothyroidism


Hematologic History: Reports: Anemia, Other (See Below)


Other Hematologic History: iron deficiency anemia





- Past Surgical History


Musculoskeletal Surgical History: Reports: Hip Replacement, Other (See Below)


Other Musculoskeletal Surgeries/Procedures:: rt tka





Social & Family History





- Family History


Family Medical History: Noncontributory





- Tobacco Use


Smoking Status *Q: Current Every Day Smoker


Years of Tobacco use: 50


Packs/Tins Daily: 1





- Caffeine Use


Caffeine Use: Reports: Coffee





- Recreational Drug Use


Recreational Drug Use: No





- Living Situation & Occupation


Living situation: Reports: Single, Alone


Occupation: Retired





H&P Review of Systems





- Review of Systems:


Review Of Systems: See Below


General: Reports: Weakness.  Denies: Fever


Pulmonary: Denies: Shortness of Breath


Cardiovascular: Denies: Chest Pain, Edema


Gastrointestinal: Denies: Abdominal Pain


Genitourinary: Denies: Dysuria, Frequency


Psychiatric: Denies: Confusion


Neurological: Reports: Difficulty Walking, Weakness, Gait Disturbance.  Denies: 

Confusion, Dizziness, Paresthesia, Tremors, Trouble Speaking





Exam





- Exam


Exam: See Below





- Vital Signs


Vital Signs: 


 Last Vital Signs











Temp  97.4 F   05/29/20 17:48


 


Pulse  95   05/29/20 17:48


 


Resp  16   05/29/20 17:48


 


BP  127/89   05/29/20 17:48


 


Pulse Ox  97   05/29/20 17:48











Weight: 135 lb





- Exam


General: Alert, Oriented


Neck: Supple


Lungs: Clear to Auscultation, Normal Respiratory Effort


Cardiovascular: Regular Rate, Regular Rhythm


GI/Abdominal Exam: Normal Bowel Sounds, Soft, Non-Tender, Other (Obese)


Extremities: No Pedal Edema


Skin: Warm, Other (Bruises on the left extremities)


Neuro Extensive - Mental Status: Alert, Oriented x3


Neuro Extensive - Motor, Sensory, Reflexes: No: Ataxia, Expressive Aphasia, 

Abnormal Motor


Psychiatric: Alert, Normal Affect, Normal Mood





- Patient Data


Lab Results Last 24 hrs: 


 Laboratory Results - last 24 hr











  05/29/20 05/29/20 05/29/20 Range/Units





  17:41 17:50 17:50 


 


WBC   12.9 H   (5.0-10.0)  10^3/uL


 


RBC   4.36   (4.2-5.4)  10^6/uL


 


Hgb   14.2   (12.0-16.0)  g/dL


 


Hct   39.9   (37.0-47.0)  %


 


MCV   91.5   ()  fL


 


MCH   32.6   (27.0-34.0)  pg


 


MCHC   35.6 H   (33.0-35.0)  g/dL


 


Plt Count   222  D   (150-450)  10^3/uL


 


Neut % (Auto)   85.2 H   (42.2-75.2)  %


 


Lymph % (Auto)   5.9 L   (20.5-50.1)  %


 


Mono % (Auto)   7.9   (2-8)  %


 


Eos % (Auto)   0.8 L   (1.0-3.0)  %


 


Baso % (Auto)   0.2   (0.0-1.0)  %


 


Sodium    126 L  (136-145)  mmol/L


 


Potassium    3.1 L  (3.5-5.1)  mmol/L


 


Chloride    88 L  ()  mmol/L


 


Carbon Dioxide    33 H  (21-32)  mmol/L


 


Anion Gap    8.1  (7-13)  mEq/L


 


BUN    8  (7-18)  mg/dL


 


Creatinine    0.88  (0.55-1.02)  mg/dL


 


Est Cr Clr Drug Dosing    49.21  mL/min


 


Estimated GFR (MDRD)    > 60  


 


BUN/Creatinine Ratio    9.1  (No establ ref range)  


 


Glucose    92  (74-99)  mg/dL


 


POC Glucose  95    ()  mg/dl


 


Calcium    8.7  (8.5-10.1)  mg/dL


 


Total Bilirubin    0.7  (0.2-1.0)  mg/dL


 


AST    51 H  (15-37)  U/L


 


ALT    33  (14-59)  U/L


 


Alkaline Phosphatase    119 H  ()  U/L


 


Troponin I    < 0.017  (0.000-0.056)  ng/mL


 


Total Protein    6.9  (6.4-8.2)  g/dL


 


Albumin    3.7  (3.4-5.0)  g/dL


 


Globulin    3.2  


 


Albumin/Globulin Ratio    1.2  


 


Urine Color     (YELLOW)  


 


Urine Appearance     (CLEAR)  


 


Urine pH     (5.0-9.0)  


 


Ur Specific Gravity     (1.005-1.030)  


 


Urine Protein     (NEGATIVE)  


 


Urine Glucose (UA)     (NEGATIVE)  


 


Urine Ketones     (NEGATIVE)  


 


Urine Occult Blood     (NEGATIVE)  


 


Urine Nitrite     (NEGATIVE)  


 


Urine Bilirubin     (NEGATIVE)  


 


Urine Urobilinogen     (0.2-1.0)  mg/dL


 


Ur Leukocyte Esterase     (NEGATIVE)  


 


Urine RBC     /HPF


 


Urine WBC     (0-5/HPF)  /HPF


 


Ur Epithelial Cells     (NOT SEEN)  /HPF


 


Urine Bacteria     (0-FEW/HPF)  /HPF


 


SARS-CoV-2 RNA (RT-PCR)     (NEGATIVE)  














  05/29/20 05/29/20 Range/Units





  18:27 18:50 


 


WBC    (5.0-10.0)  10^3/uL


 


RBC    (4.2-5.4)  10^6/uL


 


Hgb    (12.0-16.0)  g/dL


 


Hct    (37.0-47.0)  %


 


MCV    ()  fL


 


MCH    (27.0-34.0)  pg


 


MCHC    (33.0-35.0)  g/dL


 


Plt Count    (150-450)  10^3/uL


 


Neut % (Auto)    (42.2-75.2)  %


 


Lymph % (Auto)    (20.5-50.1)  %


 


Mono % (Auto)    (2-8)  %


 


Eos % (Auto)    (1.0-3.0)  %


 


Baso % (Auto)    (0.0-1.0)  %


 


Sodium    (136-145)  mmol/L


 


Potassium    (3.5-5.1)  mmol/L


 


Chloride    ()  mmol/L


 


Carbon Dioxide    (21-32)  mmol/L


 


Anion Gap    (7-13)  mEq/L


 


BUN    (7-18)  mg/dL


 


Creatinine    (0.55-1.02)  mg/dL


 


Est Cr Clr Drug Dosing    mL/min


 


Estimated GFR (MDRD)    


 


BUN/Creatinine Ratio    (No establ ref range)  


 


Glucose    (74-99)  mg/dL


 


POC Glucose    ()  mg/dl


 


Calcium    (8.5-10.1)  mg/dL


 


Total Bilirubin    (0.2-1.0)  mg/dL


 


AST    (15-37)  U/L


 


ALT    (14-59)  U/L


 


Alkaline Phosphatase    ()  U/L


 


Troponin I    (0.000-0.056)  ng/mL


 


Total Protein    (6.4-8.2)  g/dL


 


Albumin    (3.4-5.0)  g/dL


 


Globulin    


 


Albumin/Globulin Ratio    


 


Urine Color  Yellow   (YELLOW)  


 


Urine Appearance  Clear   (CLEAR)  


 


Urine pH  7.0   (5.0-9.0)  


 


Ur Specific Gravity  1.020   (1.005-1.030)  


 


Urine Protein  Negative   (NEGATIVE)  


 


Urine Glucose (UA)  Negative   (NEGATIVE)  


 


Urine Ketones  Negative   (NEGATIVE)  


 


Urine Occult Blood  Trace-intact H   (NEGATIVE)  


 


Urine Nitrite  Negative   (NEGATIVE)  


 


Urine Bilirubin  Negative   (NEGATIVE)  


 


Urine Urobilinogen  0.2   (0.2-1.0)  mg/dL


 


Ur Leukocyte Esterase  Negative   (NEGATIVE)  


 


Urine RBC  0-5   /HPF


 


Urine WBC  0-5   (0-5/HPF)  /HPF


 


Ur Epithelial Cells  Few   (NOT SEEN)  /HPF


 


Urine Bacteria  Few   (0-FEW/HPF)  /HPF


 


SARS-CoV-2 RNA (RT-PCR)   Negative  (NEGATIVE)  











Result Diagrams: 


 05/29/20 17:50





 05/29/20 17:50





- Problem List


(1) Hypokalemia


SNOMED Code(s): 42252029


   ICD Code: E87.6 - HYPOKALEMIA   Status: Acute   Current Visit: Yes   





(2) Electrolyte imbalance


SNOMED Code(s): 529460507


   ICD Code: E87.8 - OTH DISORDERS OF ELECTROLYTE AND FLUID BALANCE, NEC   

Status: Acute   Current Visit: No   





(3) Hyponatremia


SNOMED Code(s): 90453021


   ICD Code: E87.1 - HYPO-OSMOLALITY AND HYPONATREMIA   Status: Acute   Current 

Visit: No   





(4) Multiple falls


SNOMED Code(s): 876478402


   ICD Code: R29.6 - REPEATED FALLS   Status: Acute   Current Visit: No   


Problem List Initiated/Reviewed/Updated: Yes


Orders Last 24hrs: 


 Active Orders 24 hr











 Category Date Time Status


 


 Admission Diagnosis [ADT] Stat ADT  05/29/20 19:45 Ordered


 


 Admission Status [Patient Status] [ADT] Routine ADT  05/29/20 19:45 Active


 


 Antiembolic Devices [RC] PER UNIT ROUTINE Care  05/29/20 20:53 Ordered


 


 EKG Documentation Completion [RC] STAT Care  05/29/20 17:37 Active


 


 Oxygen Therapy [RC] PRN Care  05/29/20 20:52 Ordered


 


 Peripheral IV Care [RC] .AS DIRECTED Care  05/29/20 20:53 Ordered


 


 Up With Assistance [RC] ASDIRECTED Care  05/29/20 20:52 Ordered


 


 VTE/DVT Education [RC] PER UNIT ROUTINE Care  05/29/20 20:52 Ordered


 


 Vital Signs [RC] Q4H Care  05/29/20 20:52 Ordered


 


 Regular Diet [DIET] Diet  05/29/20 Breakfast Ordered


 


 BASIC METABOLIC PANEL,BMP [CHEM] AM Lab  05/30/20 05:15 Ordered


 


 CBC WITH AUTO DIFF [HEME] AM Lab  05/30/20 05:15 Ordered


 


 Acetaminophen [Tylenol] Med  05/29/20 20:52 Ordered





 650 mg PO Q4H PRN   


 


 Acetaminophen/HYDROcodone [Norco 325-10 MG] Med  05/29/20 20:52 Ordered





 1 tab PO Q4H PRN   


 


 Chlorthalidone Med  05/30/20 09:00 Ordered





 12.5 mg PO DAILY   


 


 DULoxetine [Cymbalta] Med  05/30/20 09:00 Ordered





 30 mg PO DAILY   


 


 Dicyclomine [Bentyl] Med  05/29/20 21:00 Ordered





 10 mg PO BID   


 


 Enalapril [Vasotec] Med  05/30/20 09:00 Ordered





 40 mg PO DAILY   


 


 Estrogens, Conjugated [Premarin] Med  05/30/20 09:00 Ordered





 0.625 mg PO DAILY   


 


 Heparin Sodium Med  05/29/20 22:00 Ordered





 5,000 units SUBCUT Q8HR   


 


 Levothyroxine [Synthroid] Med  05/30/20 06:00 Ordered





 88 mcg PO ACBREAKFAST   


 


 Morphine Med  05/29/20 20:52 Ordered





 1 mg IVPUSH Q2H PRN   


 


 NS + KCl 20mEq/L [Normal Saline with 20 mEq KCl] 1,000 Med  05/29/20 20:45 

Ordered





 ml   





 IV ASDIRECTED   


 


 Ondansetron [Zofran ODT] Med  05/29/20 20:52 Ordered





 4 mg PO Q6H PRN   


 


 Pantoprazole [ProTONIX***] Med  05/30/20 06:00 Ordered





 40 mg PO ACBREAKFAST   


 


 Potassium Chloride [Klor-Con 10] Med  05/29/20 20:55 Once





 40 meq PO ONETIME ONE   


 


 Sodium Chloride 0.9% [Saline Flush] Med  05/29/20 20:52 Ordered





 10 ml FLUSH ASDIRECTED PRN   


 


 amLODIPine [Norvasc] Med  05/30/20 09:00 Ordered





 10 mg PO DAILY   


 


 Antiembolic Hose [OM.PC] Per Unit Routine Oth  05/29/20 20:52 Ordered


 


 Peripheral IV Insertion Adult [OM.PC] Routine Oth  05/29/20 20:52 Ordered


 


 Resuscitation Status Routine Resus Stat  05/29/20 20:52 Ordered








 Medication Orders





Acetaminophen (Tylenol)  650 mg PO Q4H PRN


   PRN Reason: Pain (Mild 1-3)/fever


Hydrocodone Bitart/Acetaminophen (Norco 325-10 Mg)  1 tab PO Q4H PRN


   PRN Reason: Pain (moderate 4-6)


Amlodipine Besylate (Norvasc)  10 mg PO DAILY [facility]


Chlorthalidone (Chlorthalidone)  12.5 mg PO DAILY [facility]


Dicyclomine HCl (Bentyl)  10 mg PO BID [facility]


Duloxetine HCl (Cymbalta)  30 mg PO DAILY [facility]


Enalapril Maleate (Vasotec)  40 mg PO DAILY [facility]


Estrogens Conjugated (Premarin)  0.625 mg PO DAILY [facility]


Heparin Sodium (Porcine) (Heparin Sodium)  5,000 units SUBCUT Q8HR [facility]


Potassium Chloride/Sodium Chloride (Normal Saline With 20 Meq Kcl)  1,000 mls @ 

50 mls/hr IV ASDIRECTED [facility]


Levothyroxine Sodium (Synthroid)  88 mcg PO ACBREAKFAST [facility]


Morphine Sulfate (Morphine)  1 mg IVPUSH Q2H PRN


   PRN Reason: Pain (severe 7-10)


Ondansetron HCl (Zofran Odt)  4 mg PO Q6H PRN


   PRN Reason: nausea, able to take PO


Pantoprazole Sodium (Protonix***)  40 mg PO ACBREAKFAST [facility]


Potassium Chloride (Klor-Con 10)  40 meq PO ONETIME ONE


   Stop: 05/29/20 20:56


Sodium Chloride (Saline Flush)  10 ml FLUSH ASDIRECTED PRN


   PRN Reason: Keep Vein Open








Assessment/Plan Comment:: 





70-year-old lady with a history of hypothyroidism, hypertension.


The patient has had prior hip surgery and has 1 inch discrepancy between lower 

extremity lengths. She also has advanced arthritis.


The patient fell early in the morning on the day of admission. She was unable 

to get up 4-3 hours. She was crawling on the floor. Developed multiple bruises. 

Came to the emergency room.


She denies loss of consciousness, no headache, chest pain, shortness of breath.


She has been complaining of weakness for a few days duration. No new  

medication.








Weakness, frequent falling


With baseline severe arthritis, leg length discrepancy


Will have physical and occupational therapy when available


Well monitor strength and stability


Pain management with Tylenol, hydrocodone, IV morphine for mild, moderate, 

severe pain





Hyponatremia, hypokalemia


Well give IV hydration


IV and oral replacement of potassium





Hypertension


Continue treatment with Norvasc, chlorthalidone, Lisinopril





History of depression


Continue Cymbalta and amitriptyline





Hypothyroidism


Continue Synthroid





DVT prophylaxis with subcutaneous heparin

## 2020-05-29 NOTE — CT
PROCEDURE INFORMATION: 

Exam: CT Head Without Contrast 

Exam date and time: 5/29/2020 5:38 PM 

Age: 70 years old 

Clinical indication: Injury or trauma; Fall; Initial encounter; Concussion / 

head injury; Without loss of consciousness; Additional info: Ground level fall 

(hit head) 



TECHNIQUE: 

Imaging protocol: Computed tomography of the head without contrast. 

Radiation optimization: All CT scans at this facility use at least one of these 

dose optimization techniques: automated exposure control; mA and/or kV 

adjustment per patient size (includes targeted exams where dose is matched to 

clinical indication); or iterative reconstruction. 



COMPARISON: 

1. CT Head wo Cont 6/14/2019 7:55 AM 

2. (No prior similar studies are available for comparison.) 



FINDINGS: 

 There is no mass lesion or mass effect. 

 There is diffuse central and cortical atrophy consistent with age. 

 There is no CT evidence of acute parenchymal ischemia. 

 There is no intra-axial or extra-axial hemorrhage. 

 There is no evidence of acute obstructive sinonasal disease. 

 Mastoid air cells are grossly normal. 

 Visualized osseous structures are normal. 

Other findings: EXAM TYPE: CT of the BRAIN; DATE AND TIME: 5/29/2020 5:38 PM; 

CLINICAL INFORMATION:; Injury or trauma; Fall; Initial encounter; Concussion / 

head injury; Without loss of consciousness; Additional info: Ground level fall 

(hit head) 



IMPRESSION: 

1. Small right posterior scalp laceration. No acute fracture 

2. Central and cortical atrophy consistent with age. 

3. No CT evidence of acute infarction, intracranial hemorrhage or mass.

## 2020-05-29 NOTE — EDM.PDOC
<Owen Prado M - Last Filed: 05/29/20 18:52>





ED HPI GENERAL MEDICAL PROBLEM





- General


Stated Complaint: AMBULANCE


Time Seen by Provider: 05/29/20 17:28


Source of Information: Reports: Patient, EMS


History Limitations: Reports: No Limitations





- History of Present Illness


INITIAL COMMENTS - FREE TEXT/NARRATIVE: 





This 69 yo female patient reports to the ED with LRAS due to several falls 

today. The patient reports her first fall was early this morning when she got 

up to go to the bathroom, since that time the patient has fallen at least 4 

additional times. The most recent fall caused the patient to hit the back of 

her head and her left ribs during the fall. The patient was alert and oriented 

upon arrival in the ED. The patient reports she did not take her morning 

medications today, has not eaten today and has not even had her coffee today. 

The patient has had surgery on her right thumb and has a fracture of her left 

wrist (3 weeks old). The patient denies any history of abuse. The patient 

reports generalized weakness as the main cause of the falls. The patient denies 

any recent changes in medications. The patient reports she was feeling "great" 

yesterday. 


Onset: Today


Duration: Recurring (falls)


Location: Reports: Head (posterior scalp contusion), Upper Extremity, Left, 

Upper Extremity, Right, Lower Extremity, Left, Lower Extremity, Right


Quality: Reports: Ache, Dull


Severity: Mild


Improves with: Reports: None


Worsens with: Reports: None


Context: Reports: Trauma (Multiple ground level falls)


Associated Symptoms: Reports: Weakness (Generalized)





- Related Data


 Allergies











Allergy/AdvReac Type Severity Reaction Status Date / Time


 


amoxicillin trihydrate Allergy  Nausea Verified 08/01/19 17:08





[From Augmentin]     


 


potassium clavulanate Allergy  Nausea Verified 08/01/19 17:08





[From Augmentin]     


 


codeine AdvReac Mild Vomiting Verified 05/29/20 17:49











Home Meds: 


 Home Meds





Amitriptyline [Elavil] 300 mg PO BEDTIME 12/15/14 [History]


Aspirin [Children's Aspirin] 1 tab PO DAILY 12/15/14 [History]


Calcium Citrate/Vitamin D3 [Calcium Citrate with D Tablet] 1 tab PO DAILY 12/15/

14 [History]


Estrogens, Conjugated [Premarin] 0.625 mg PO DAILY 12/15/14 [History]


Levothyroxine Sodium [Synthroid] 112 mcg PO DAILY 12/15/14 [History]


Lisinopril 40 mg PO DAILY 12/15/14 [History]


Meloxicam [Mobic] 1 tab PO DAILY 12/15/14 [History]


Multivitamin [Daily Multiple Vitamin] 1 tab PO DAILY 12/15/14 [History]


Ferrous Sulfate 325 mg PO BIDM 01/14/15 [History]


Lisinopril 20 mg PO DAILY 01/14/15 [History]


Mesalamine [Delzicol] 2 tab PO TID 01/14/15 [History]


Morphine Sulfate 15 mg PO ASDIRECTED 01/14/15 [History]


Pantoprazole [ProTONIX***] 40 mg PO DAILY 01/14/15 [History]


predniSONE 20 mg PO BID 01/14/15 [History]


Dicyclomine [Bentyl] 10 mg PO TID #30 cap 01/16/15 [Rx]


Morphine Sulfate [Morphine Sulfate ER] 2 tab PO BID #1 tablet.er 01/16/15 [Rx]


Albuterol Sulfate [Albuterol Sulfate Hfa] 2 puff INH Q6H PRN 05/29/20 [History]


Fluticasone Propionate [Flonase] 1 spray NASBOTH DAILY 05/29/20 [History]











Past Medical History


Respiratory History: Reports: COPD, Other (See Below) (Chronic tobacco smoking 

dependence)


Gastrointestinal History: Reports: Cholelithiasis


Other Gastrointestinal History: elevated LFTs


Endocrine/Metabolic History: Reports: Hypothyroidism


Hematologic History: Reports: Anemia, Other (See Below)


Other Hematologic History: iron deficiency anemia





- Past Surgical History


Musculoskeletal Surgical History: Reports: Hip Replacement, Other (See Below)


Other Musculoskeletal Surgeries/Procedures:: rt tka





Social & Family History





- Family History


Family Medical History: Noncontributory





- Caffeine Use


Caffeine Use: Reports: Coffee





- Living Situation & Occupation


Living situation: Reports: Single, Alone


Occupation: Retired





ED ROS GENERAL





- Review of Systems


Review Of Systems: Comprehensive ROS is negative, except as noted in HPI.





ED EXAM, GENERAL





- Physical Exam


Exam: See Below


Exam Limited By: No Limitations


General Appearance: Alert, WD/WN, Moderate Distress


Eye Exam: Bilateral Eye: EOMI, Normal Inspection, PERRL


Ears: Normal External Exam, Normal Canal, Hearing Grossly Normal, Normal TMs


Nose: Normal Inspection, Normal Mucosa, No Blood


Throat/Mouth: Normal Inspection, Normal Lips, Normal Teeth, Normal Gums, Normal 

Oropharynx, Normal Voice, No Airway Compromise


Head: Other (posterior scalp contusion (tenderness with palpation))


Neck: Normal Inspection, Supple, Non-Tender, Full Range of Motion


Respiratory/Chest: No Respiratory Distress, Lungs Clear, Normal Breath Sounds, 

No Accessory Muscle Use, Other (left lower rib tenderness wiht contusion)


Cardiovascular: Normal Peripheral Pulses, Regular Rate, Rhythm


GI/Abdominal: Normal Bowel Sounds, Soft, Non-Tender, No Organomegaly, No 

Distention, No Abnormal Bruit, No Mass, Pelvis Stable


 (Female) Exam: Deferred


Rectal (Female) Exam: Deferred


Extremities: Other (The patient has numerous bruises on her upper and lower 

extremities in various stages of healing. The patent has a skin tear on her 

left arm, right shoulder, right arm and left lower extremity)


Neurological: Alert, Oriented, CN II-XII Intact, Normal Cognition, No Motor/

Sensory Deficits


Psychiatric: Normal Affect, Normal Mood


Skin Exam: Warm, Dry, Other (numerous contusions, abrasions and skin tears to 

the patient's extremities. Contusion to left lower rib.)


Lymphatic: No Adenopathy





Course





- Vital Signs


Last Recorded V/S: 





 Last Vital Signs











Temp  36.3 C   05/29/20 17:48


 


Pulse  95   05/29/20 17:48


 


Resp  16   05/29/20 17:48


 


BP  127/89   05/29/20 17:48


 


Pulse Ox  97   05/29/20 17:48














- Orders/Labs/Meds


Orders: 





 Active Orders 24 hr











 Category Date Time Status


 


 EKG Documentation Completion [RC] STAT Care  05/29/20 17:37 Active











Labs: 





 Laboratory Tests











  05/29/20 05/29/20 05/29/20 Range/Units





  17:41 17:50 17:50 


 


WBC   12.9 H   (5.0-10.0)  10^3/uL


 


RBC   4.36   (4.2-5.4)  10^6/uL


 


Hgb   14.2   (12.0-16.0)  g/dL


 


Hct   39.9   (37.0-47.0)  %


 


MCV   91.5   ()  fL


 


MCH   32.6   (27.0-34.0)  pg


 


MCHC   35.6 H   (33.0-35.0)  g/dL


 


Plt Count   222  D   (150-450)  10^3/uL


 


Neut % (Auto)   85.2 H   (42.2-75.2)  %


 


Lymph % (Auto)   5.9 L   (20.5-50.1)  %


 


Mono % (Auto)   7.9   (2-8)  %


 


Eos % (Auto)   0.8 L   (1.0-3.0)  %


 


Baso % (Auto)   0.2   (0.0-1.0)  %


 


Sodium    126 L  (136-145)  mmol/L


 


Potassium    3.1 L  (3.5-5.1)  mmol/L


 


Chloride    88 L  ()  mmol/L


 


Carbon Dioxide    33 H  (21-32)  mmol/L


 


Anion Gap    8.1  (7-13)  mEq/L


 


BUN    8  (7-18)  mg/dL


 


Creatinine    0.88  (0.55-1.02)  mg/dL


 


Est Cr Clr Drug Dosing    49.21  mL/min


 


Estimated GFR (MDRD)    > 60  


 


BUN/Creatinine Ratio    9.1  (No establ ref range)  


 


Glucose    92  (74-99)  mg/dL


 


POC Glucose  95    ()  mg/dl


 


Calcium    8.7  (8.5-10.1)  mg/dL


 


Total Bilirubin    0.7  (0.2-1.0)  mg/dL


 


AST    51 H  (15-37)  U/L


 


ALT    33  (14-59)  U/L


 


Alkaline Phosphatase    119 H  ()  U/L


 


Troponin I    < 0.017  (0.000-0.056)  ng/mL


 


Total Protein    6.9  (6.4-8.2)  g/dL


 


Albumin    3.7  (3.4-5.0)  g/dL


 


Globulin    3.2  


 


Albumin/Globulin Ratio    1.2  


 


Urine Color     (YELLOW)  


 


Urine Appearance     (CLEAR)  


 


Urine pH     (5.0-9.0)  


 


Ur Specific Gravity     (1.005-1.030)  


 


Urine Protein     (NEGATIVE)  


 


Urine Glucose (UA)     (NEGATIVE)  


 


Urine Ketones     (NEGATIVE)  


 


Urine Occult Blood     (NEGATIVE)  


 


Urine Nitrite     (NEGATIVE)  


 


Urine Bilirubin     (NEGATIVE)  


 


Urine Urobilinogen     (0.2-1.0)  mg/dL


 


Ur Leukocyte Esterase     (NEGATIVE)  


 


Urine RBC     /HPF


 


Urine WBC     (0-5/HPF)  /HPF


 


Ur Epithelial Cells     (NOT SEEN)  /HPF


 


Urine Bacteria     (0-FEW/HPF)  /HPF


 


SARS-CoV-2 RNA (RT-PCR)     (NEGATIVE)  














  05/29/20 05/29/20 Range/Units





  18:27 18:50 


 


WBC    (5.0-10.0)  10^3/uL


 


RBC    (4.2-5.4)  10^6/uL


 


Hgb    (12.0-16.0)  g/dL


 


Hct    (37.0-47.0)  %


 


MCV    ()  fL


 


MCH    (27.0-34.0)  pg


 


MCHC    (33.0-35.0)  g/dL


 


Plt Count    (150-450)  10^3/uL


 


Neut % (Auto)    (42.2-75.2)  %


 


Lymph % (Auto)    (20.5-50.1)  %


 


Mono % (Auto)    (2-8)  %


 


Eos % (Auto)    (1.0-3.0)  %


 


Baso % (Auto)    (0.0-1.0)  %


 


Sodium    (136-145)  mmol/L


 


Potassium    (3.5-5.1)  mmol/L


 


Chloride    ()  mmol/L


 


Carbon Dioxide    (21-32)  mmol/L


 


Anion Gap    (7-13)  mEq/L


 


BUN    (7-18)  mg/dL


 


Creatinine    (0.55-1.02)  mg/dL


 


Est Cr Clr Drug Dosing    mL/min


 


Estimated GFR (MDRD)    


 


BUN/Creatinine Ratio    (No establ ref range)  


 


Glucose    (74-99)  mg/dL


 


POC Glucose    ()  mg/dl


 


Calcium    (8.5-10.1)  mg/dL


 


Total Bilirubin    (0.2-1.0)  mg/dL


 


AST    (15-37)  U/L


 


ALT    (14-59)  U/L


 


Alkaline Phosphatase    ()  U/L


 


Troponin I    (0.000-0.056)  ng/mL


 


Total Protein    (6.4-8.2)  g/dL


 


Albumin    (3.4-5.0)  g/dL


 


Globulin    


 


Albumin/Globulin Ratio    


 


Urine Color  Yellow   (YELLOW)  


 


Urine Appearance  Clear   (CLEAR)  


 


Urine pH  7.0   (5.0-9.0)  


 


Ur Specific Gravity  1.020   (1.005-1.030)  


 


Urine Protein  Negative   (NEGATIVE)  


 


Urine Glucose (UA)  Negative   (NEGATIVE)  


 


Urine Ketones  Negative   (NEGATIVE)  


 


Urine Occult Blood  Trace-intact H   (NEGATIVE)  


 


Urine Nitrite  Negative   (NEGATIVE)  


 


Urine Bilirubin  Negative   (NEGATIVE)  


 


Urine Urobilinogen  0.2   (0.2-1.0)  mg/dL


 


Ur Leukocyte Esterase  Negative   (NEGATIVE)  


 


Urine RBC  0-5   /HPF


 


Urine WBC  0-5   (0-5/HPF)  /HPF


 


Ur Epithelial Cells  Few   (NOT SEEN)  /HPF


 


Urine Bacteria  Few   (0-FEW/HPF)  /HPF


 


SARS-CoV-2 RNA (RT-PCR)   Negative  (NEGATIVE)  











Meds: 





Medications














Discontinued Medications














Generic Name Dose Route Start Last Admin





  Trade Name Freq  PRN Reason Stop Dose Admin


 


Potassium Chloride 10 meq/  100 mls @ 100 mls/hr  05/29/20 18:26  05/29/20 18:35





  Premix  IV  05/29/20 19:25  100 mls/hr





  ONETIME ONE   Administration





     





     





     





     


 


Sodium Chloride  1,000 mls @ 999 mls/hr  05/29/20 18:26  05/29/20 18:33





  Normal Saline  IV  05/29/20 19:26  400 mls/hr





  .BOLUS ONE   Administration





     





     





     





     














Departure





- Departure


Disposition: Refer to Observation


Clinical Impression: 


 Electrolyte imbalance, Asthenia, Multiple falls, Hyponatremia








- Discharge Information


Forms:  ED Department Discharge





Sepsis Event Note





- Focused Exam


Vital Signs: 





 Vital Signs











  Temp Pulse Resp BP Pulse Ox


 


 05/29/20 17:48  36.3 C  95  16  127/89  97











Date Exam was Performed: 05/29/20


Time Exam was Performed: 18:52





<Yousif Beckwith - Last Filed: 05/29/20 19:45>





Course





- Re-Assessments/Exams


Free Text/Narrative Re-Assessment/Exam: 





05/29/20 19:43


case discussed with Dr Salomon who kindly admitted pt to observation.





Departure





- Departure


Time of Disposition: 19:43


Condition: Good





Sepsis Event Note





- Focused Exam


Date Exam was Performed: 05/29/20


Time Exam was Performed: 19:43

## 2020-05-29 NOTE — CR
PROCEDURE INFORMATION: 

Exam: XR Chest, 1 View 

Exam date and time: 5/29/2020 6:03 PM 

Age: 70 years old 

Clinical indication: Injury or trauma; Fall; Initial encounter; Additional 

info: Falls with bruising to the left lower ribs 



TECHNIQUE: 

Imaging protocol: XR of the chest 

Views: 1 view. 



COMPARISON: 

CR Chest 2V 7/2/2019 10:40 AM 



FINDINGS: 

Lungs: Unremarkable. No consolidation. 

Pleural space: Unremarkable. No pleural effusion. No pneumothorax. 

Heart/Mediastinum: Unremarkable. No cardiomegaly. 

Bones/joints: Unremarkable. 



IMPRESSION: 

No acute findings. 

No evidence for rib fracture.
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
## 2020-05-31 NOTE — PCM.PN
- General Info


Date of Service: 05/31/20


Admission Dx/Problem (Free Text): 


 Admission Diagnosis/Problem





Admission Diagnosis/Problem      Falls








Subjective Update: 





Feels improved. less Generalized discomfort.


activity is improving, able to walk but still has difficulty getting out of bed


No chest pain, shortness of breath, abdominal pain.


Functional Status: Reports: Tolerating Diet, Ambulating





- Review of Systems


General: Reports: Weakness.  Denies: Fever


Pulmonary: Denies: Shortness of Breath


Cardiovascular: Denies: Chest Pain


Genitourinary: Denies: Dysuria


Neurological: Denies: Confusion





- Patient Data


Vitals - Most Recent: 


 Last Vital Signs











Temp  97.3 F   05/31/20 08:00


 


Pulse  82   05/31/20 08:00


 


Resp  20   05/31/20 08:00


 


BP  113/80   05/31/20 08:34


 


Pulse Ox  90 L  05/31/20 08:00











Weight - Most Recent: 135 lb


I&O - Last 24 Hours: 


 Intake & Output











 05/30/20 05/31/20 05/31/20





 22:59 06:59 14:59


 


Intake Total 1000 660 


 


Output Total 500  


 


Balance 500 660 











Lab Results Last 24 Hours: 


 Laboratory Results - last 24 hr











  05/31/20 05/31/20 Range/Units





  05:30 05:30 


 


WBC  5.3   (5.0-10.0)  10^3/uL


 


RBC  3.81 L   (4.2-5.4)  10^6/uL


 


Hgb  12.4   (12.0-16.0)  g/dL


 


Hct  37.1   (37.0-47.0)  %


 


MCV  97.4   ()  fL


 


MCH  32.5   (27.0-34.0)  pg


 


MCHC  33.4   (33.0-35.0)  g/dL


 


Plt Count  183   (150-450)  10^3/uL


 


Neut % (Auto)  74.7   (42.2-75.2)  %


 


Lymph % (Auto)  16.0 L   (20.5-50.1)  %


 


Mono % (Auto)  6.6   (2-8)  %


 


Eos % (Auto)  2.5   (1.0-3.0)  %


 


Baso % (Auto)  0.2   (0.0-1.0)  %


 


Add Manual Diff  Yes   


 


Neutrophils % (Manual)  79 H   (42-75)  %


 


Lymphocytes % (Manual)  16 L   (20-50)  %


 


Monocytes % (Manual)  3   (2-8)  %


 


Eosinophils % (Manual)  2   (1-3)  %


 


Sodium   133 L  (136-145)  mmol/L


 


Potassium   3.7  (3.5-5.1)  mmol/L


 


Chloride   101  ()  mmol/L


 


Carbon Dioxide   26  (21-32)  mmol/L


 


Anion Gap   9.7  (7-13)  mEq/L


 


BUN   9  (7-18)  mg/dL


 


Creatinine   0.86  (0.55-1.02)  mg/dL


 


Est Cr Clr Drug Dosing   48.14  mL/min


 


Estimated GFR (MDRD)   > 60  


 


Glucose   88  (74-99)  mg/dL


 


Calcium   7.7 L  (8.5-10.1)  mg/dL











Med Orders - Current: 


 Current Medications





Acetaminophen (Tylenol)  650 mg PO Q4H PRN


   PRN Reason: Pain (Mild 1-3)/fever


   Last Admin: 05/30/20 15:04 Dose:  650 mg


Hydrocodone Bitart/Acetaminophen (Norco 325-10 Mg)  1 tab PO Q4H PRN


   PRN Reason: Pain (moderate 4-6)


   Last Admin: 05/31/20 11:02 Dose:  1 tab


Amlodipine Besylate (Norvasc)  10 mg PO DAILY [facility]


   Last Admin: 05/31/20 08:32 Dose:  10 mg


Chlorthalidone (Chlorthalidone)  12.5 mg PO DAILY [facility]


   Last Admin: 05/31/20 08:33 Dose:  12.5 mg


Dicyclomine HCl (Bentyl)  10 mg PO BID [facility]


   Last Admin: 05/31/20 08:35 Dose:  10 mg


Duloxetine HCl (Cymbalta)  30 mg PO DAILY [facility]


   Last Admin: 05/31/20 08:33 Dose:  30 mg


Enalapril Maleate (Vasotec)  40 mg PO DAILY [facility]


   Last Admin: 05/31/20 08:34 Dose:  40 mg


Estrogens Conjugated (Premarin)  0.625 mg PO DAILY [facility]


   Last Admin: 05/31/20 08:35 Dose:  0.625 mg


Heparin Sodium (Porcine) (Heparin Sodium)  5,000 units SUBCUT Q8HR [facility]


   Last Admin: 05/31/20 06:36 Dose:  5,000 units


Levothyroxine Sodium (Synthroid)  88 mcg PO ACBREAKFAST [facility]


   Last Admin: 05/31/20 06:36 Dose:  88 mcg


Morphine Sulfate (Morphine)  1 mg IVPUSH Q2H PRN


   PRN Reason: Pain (severe 7-10)


Ondansetron HCl (Zofran Odt)  4 mg PO Q6H PRN


   PRN Reason: nausea, able to take PO


Pantoprazole Sodium (Protonix***)  40 mg PO ACBREAKFAST [facility]


   Last Admin: 05/31/20 06:36 Dose:  40 mg


Sodium Chloride (Saline Flush)  10 ml FLUSH ASDIRECTED PRN


   PRN Reason: Keep Vein Open





Discontinued Medications





Potassium Chloride 10 meq/ (Premix)  100 mls @ 100 mls/hr IV ONETIME ONE


   Stop: 05/29/20 19:25


   Last Admin: 05/29/20 18:35 Dose:  100 mls/hr


Sodium Chloride (Normal Saline)  1,000 mls @ 999 mls/hr IV .BOLUS ONE


   Stop: 05/29/20 19:26


   Last Admin: 05/29/20 18:33 Dose:  400 mls/hr


Potassium Chloride/Sodium Chloride (Normal Saline With 20 Meq Kcl)  1,000 mls @ 

50 mls/hr IV ASDIRECTED [facility]


   Last Admin: 05/30/20 17:23 Dose:  50 mls/hr


Potassium Chloride (Klor-Con 10)  40 meq PO ONETIME ONE


   Stop: 05/29/20 20:56


   Last Admin: 05/29/20 21:27 Dose:  40 meq











- Exam


General: Alert, Oriented


Neck: Supple


Lungs: Clear to Auscultation, Normal Respiratory Effort


Cardiovascular: Regular Rate, Regular Rhythm


GI/Abdominal Exam: Normal Bowel Sounds, Soft, Non-Tender


Extremities: No Pedal Edema





Sepsis Event Note





- Evaluation


Sepsis Screening Result: No Definite Risk





- Focused Exam


Vital Signs: 


 Vital Signs











  Temp Pulse Resp BP BP Pulse Ox


 


 05/31/20 08:34     113/80  


 


 05/31/20 08:32     113/80  


 


 05/31/20 08:00  97.3 F  82  20   113/80  90 L


 


 05/31/20 00:00  97.3 F  77  22 H   127/86  97











Date Exam was Performed: 05/31/20


Time Exam was Performed: 11:07





- Problem List & Annotations


(1) Hypokalemia


SNOMED Code(s): 50274478


   Code(s): E87.6 - HYPOKALEMIA   Status: Acute   Current Visit: Yes   





(2) Electrolyte imbalance


SNOMED Code(s): 783799175


   Code(s): E87.8 - OTH DISORDERS OF ELECTROLYTE AND FLUID BALANCE, NEC   Status

: Acute   Current Visit: No   





(3) Hyponatremia


SNOMED Code(s): 58153438


   Code(s): E87.1 - HYPO-OSMOLALITY AND HYPONATREMIA   Status: Acute   Current 

Visit: No   





(4) Multiple falls


SNOMED Code(s): 829513707


   Code(s): R29.6 - REPEATED FALLS   Status: Acute   Current Visit: No   





- Problem List Review


Problem List Initiated/Reviewed/Updated: Yes





- Plan


Plan:: 





70-year-old lady with a history of hypothyroidism, hypertension.


The patient has had prior hip surgery and has 1 inch discrepancy between lower 

extremity lengths. She also has advanced arthritis.


The patient fell early in the morning on the day of admission. She was unable 

to get up 4-3 hours. She was crawling on the floor. Developed multiple bruises. 

Came to the emergency room.


She denied loss of consciousness, no headache, chest pain, shortness of breath.


She has been complaining of weakness for a few days duration. No new  

medication.








Weakness, frequent falling


With baseline severe arthritis, leg length discrepancy


improving


Will have physical and occupational therapy when available


Well monitor strength and stability


Pain management with Tylenol, hydrocodone, IV morphine for mild, moderate, 

severe pain





Hyponatremia, hypokalemia


improved


stop IV hydration





Hypertension


Continue treatment with Norvasc, chlorthalidone, Lisinopril





History of depression


Continue Cymbalta and amitriptyline





Hypothyroidism


Continue Synthroid





DVT prophylaxis with subcutaneous heparin

## 2020-06-01 NOTE — [id number].[id number]
**Discharge Summary





- Hospital Course


Free Text/Narrative:: 





70-year-old lady with a history of hypothyroidism, hypertension.


The patient has had prior hip surgery and has 1 inch discrepancy between lower 

extremity lengths. She also has advanced arthritis.


The patient fell early in the morning on the day of admission. She was unable 

to get up 4-3 hours. She was crawling on the floor. Developed multiple bruises. 

Came to the emergency room.


She denied loss of consciousness, no headache, chest pain, shortness of breath.


She has been complaining of weakness for a few days duration. No new  

medication.








Weakness, frequent falling


With baseline severe arthritis, leg length discrepancy


improved


had evaluation with physical and occupational therapy


recommended 4 wheeled walker for stability





Hyponatremia, hypokalemia


resolved





Hypertension


Continue treatment with  chlorthalidone, Lisinopril





History of depression


Continue Cymbalta and amitriptyline





Hypothyroidism


Continue Synthroid








Diagnosis: Stroke: No





- Discharge Data


Discharge Date: 06/01/20


Discharge Disposition: Home, Self-Care 01


Condition: Fair





- Referral to Home Health


Primary Care Physician: 


Ibis Meehan MD








- Discharge Diagnosis/Problem(s)


(1) Hypokalemia


SNOMED Code(s): 04703150


   ICD Code: E87.6 - HYPOKALEMIA   Status: Acute   Current Visit: Yes   





(2) Electrolyte imbalance


SNOMED Code(s): 316141528


   ICD Code: E87.8 - OTH DISORDERS OF ELECTROLYTE AND FLUID BALANCE, NEC   

Status: Acute   Current Visit: No   





(3) Hyponatremia


SNOMED Code(s): 42721759


   ICD Code: E87.1 - HYPO-OSMOLALITY AND HYPONATREMIA   Status: Acute   Current 

Visit: No   





(4) Multiple falls


SNOMED Code(s): 139912492


   ICD Code: R29.6 - REPEATED FALLS   Status: Acute   Current Visit: No   





- Patient Summary/Data


Consults: 


 Consultations





06/01/20 08:53


OT Evaluation and Treatment [CONS] Routine 


PT Evaluation and Treatment [CONS] Routine 














- Patient Instructions


Diet: Heart Healthy Diet


Activity: As Tolerated





- Discharge Plan


*PRESCRIPTION DRUG MONITORING PROGRAM REVIEWED*: Not Applicable


*COPY OF PRESCRIPTION DRUG MONITORING REPORT IN PATIENT ELAINA: Not Applicable


Home Medications: 


 Home Meds





Amitriptyline [Elavil] 300 mg PO BEDTIME 12/15/14 [History]


Calcium Citrate/Vitamin D3 [Calcium Citrate with D Tablet] 1 tab PO DAILY 12/15/

14 [History]


Estrogens, Conjugated [Premarin] 0.625 mg PO DAILY 12/15/14 [History]


Levothyroxine Sodium [Synthroid] 112 mcg PO DAILY 12/15/14 [History]


Multivitamin [Daily Multiple Vitamin] 1 tab PO DAILY 12/15/14 [History]


Lisinopril 20 mg PO DAILY 01/14/15 [History]


Pantoprazole [ProTONIX***] 40 mg PO DAILY 01/14/15 [History]


Dicyclomine [Bentyl] 10 mg PO BID 05/29/20 [History]








Oxygen Therapy Mode: Room Air


Referrals: 


PCP,None [Ordering Only Provider] - 





- Discharge Summary/Plan Comment


DC Time >30 min.: No





- General Info


Date of Service: 06/01/20





- Review of Systems


General: Denies: Fever, Weakness (improved)


Pulmonary: Denies: Shortness of Breath


Cardiovascular: Denies: Chest Pain, Edema


Genitourinary: Denies: Dysuria


Neurological: Denies: Confusion





- Patient Data


Vitals - Most Recent: 


 Last Vital Signs











Temp  97.6 F   06/01/20 07:49


 


Pulse  83   06/01/20 07:49


 


Resp  20   05/31/20 20:00


 


BP  141/97 H  06/01/20 08:38


 


Pulse Ox  96   06/01/20 07:49











Weight - Most Recent: 135 lb


I&O - Last 24 hours: 


 Intake & Output











 05/31/20 06/01/20 06/01/20





 22:59 06:59 14:59


 


Intake Total 1440  200


 


Balance 1440  200











Med Orders - Current: 


 Current Medications





Acetaminophen (Tylenol)  650 mg PO Q4H PRN


   PRN Reason: Pain (Mild 1-3)/fever


   Last Admin: 05/31/20 21:33 Dose:  650 mg


Hydrocodone Bitart/Acetaminophen (Norco 325-10 Mg)  1 tab PO Q4H PRN


   PRN Reason: Pain (moderate 4-6)


   Last Admin: 06/01/20 08:30 Dose:  1 tab


Amlodipine Besylate (Norvasc)  10 mg PO DAILY KIRA


   Last Admin: 06/01/20 08:38 Dose:  10 mg


Chlorthalidone (Chlorthalidone)  12.5 mg PO DAILY KIRA


   Last Admin: 06/01/20 08:35 Dose:  12.5 mg


Dicyclomine HCl (Bentyl)  10 mg PO BID [facility]


   Last Admin: 06/01/20 08:37 Dose:  10 mg


Duloxetine HCl (Cymbalta)  30 mg PO DAILY [facility]


   Last Admin: 06/01/20 08:37 Dose:  30 mg


Enalapril Maleate (Vasotec)  40 mg PO DAILY [facility]


   Last Admin: 06/01/20 08:35 Dose:  40 mg


Estrogens Conjugated (Premarin)  0.625 mg PO DAILY [facility]


   Last Admin: 06/01/20 08:34 Dose:  0.625 mg


Heparin Sodium (Porcine) (Heparin Sodium)  5,000 units SUBCUT Q8HR [facility]


   Last Admin: 06/01/20 06:04 Dose:  5,000 units


Levothyroxine Sodium (Synthroid)  88 mcg PO ACBREAKFAST [facility]


   Last Admin: 06/01/20 06:04 Dose:  88 mcg


Morphine Sulfate (Morphine)  1 mg IVPUSH Q2H PRN


   PRN Reason: Pain (severe 7-10)


Ondansetron HCl (Zofran Odt)  4 mg PO Q6H PRN


   PRN Reason: nausea, able to take PO


Pantoprazole Sodium (Protonix***)  40 mg PO ACBREAKFAST [facility]


   Last Admin: 06/01/20 06:03 Dose:  40 mg


Sodium Chloride (Saline Flush)  10 ml FLUSH ASDIRECTED PRN


   PRN Reason: Keep Vein Open





Discontinued Medications





Potassium Chloride 10 meq/ (Premix)  100 mls @ 100 mls/hr IV ONETIME ONE


   Stop: 05/29/20 19:25


   Last Admin: 05/29/20 18:35 Dose:  100 mls/hr


Sodium Chloride (Normal Saline)  1,000 mls @ 999 mls/hr IV .BOLUS ONE


   Stop: 05/29/20 19:26


   Last Admin: 05/29/20 18:33 Dose:  400 mls/hr


Potassium Chloride/Sodium Chloride (Normal Saline With 20 Meq Kcl)  1,000 mls @ 

50 mls/hr IV ASDIRECTED [facility]


   Last Admin: 05/30/20 17:23 Dose:  50 mls/hr


Potassium Chloride (Klor-Con 10)  40 meq PO ONETIME ONE


   Stop: 05/29/20 20:56


   Last Admin: 05/29/20 21:27 Dose:  40 meq











- Exam


Quality Assessment: Denies: Supplemental Oxygen


General: Reports: Alert, Oriented


Lungs: Reports: Clear to Auscultation, Normal Respiratory Effort


Cardiovascular: Reports: Regular Rate, Regular Rhythm


GI/Abdominal Exam: Normal Bowel Sounds, Soft, Non-Tender


Extremities: No Pedal Edema


Skin: Reports: Warm


Neurological: Reports: No New Focal Deficit


Psy/Mental Status: Reports: Alert, Normal Affect, Normal Mood

## 2020-08-10 NOTE — EDM.PDOC
ED HPI GENERAL MEDICAL PROBLEM





- General


Chief Complaint: Head Injury


Stated Complaint: AMBULANCE


Time Seen by Provider: 08/10/20 07:51


Source of Information: Reports: Patient, EMS, Old Records, RN, RN Notes Reviewed


History Limitations: Reports: No Limitations





- History of Present Illness


INITIAL COMMENTS - FREE TEXT/NARRATIVE: 


Pt arrives to ER by ambulance with c/o laceration to the head. Pt reports she 

tripped on some cords this morning in her apartment building and fell backwards.

Pt denies loss consciousness or neck pain. She does not take Aspirin or blood 

thinners. EMS reports a laceration to back of her head measuring 2cm with  

bleeding controlled. Pt denies any other injury. Now in the ER pt states she has

had sudden onset of dizziness when being moved from EMS gurney to ER bed. Pt 

reports severe "room spin" dizziness with head motion mild nausea with the 

dizziness, otherwise not nauseated. The dizziness goes completely away if she 

lays still. Denies vomiting, visual changes, neck pain, radiating pain, 

numbness, tingling, or motor weakness. Tetanus vaccine is not up to date 

(>10yrs) per pt.





Onset: Today, Sudden


Duration: Constant


Location: Reports: Head


Quality: Reports: Ache


Severity: Mild


Improves with: Reports: None


Worsens with: Reports: None


Context: Reports: Other (Ground level fall.)


Associated Symptoms: Reports: No Other Symptoms





- Related Data


                                    Allergies











Allergy/AdvReac Type Severity Reaction Status Date / Time


 


amoxicillin trihydrate Allergy  Nausea Verified 08/10/20 07:55





[From Augmentin]     


 


potassium clavulanate Allergy  Nausea Verified 08/10/20 07:55





[From Augmentin]     


 


codeine AdvReac Mild Vomiting Verified 08/10/20 07:55











Home Meds: 


                                    Home Meds





Amitriptyline [Elavil] 300 mg PO BEDTIME 12/15/14 [History]


Calcium Citrate/Vitamin D3 [Calcium Cit 200 mg-D3 125 Unit] 1 tab PO DAILY 

12/15/14 [History]


Estrogens, Conjugated [Premarin] 0.625 mg PO DAILY 12/15/14 [History]


Levothyroxine Sodium [Synthroid] 110 mcg PO DAILY 12/15/14 [History]


Multivitamin [Daily Multiple Vitamin] 1 tab PO DAILY 12/15/14 [History]


Alendronate Sodium 70 mg PO WEEKLY 08/10/20 [History]


DULoxetine HCl [Duloxetine HCl] 30 mg PO DAILY 08/10/20 [History]


Enalapril Maleate 40 mg PO DAILY 08/10/20 [History]


Glycopyrrolate 2 mg PO BID 08/10/20 [History]


Piroxicam 20 mg PO DAILY 08/10/20 [History]


amLODIPine [Norvasc] 10 mg PO DAILY 08/10/20 [History]











Past Medical History


Respiratory History: Reports: COPD, Other (See Below) (Chronic tobacco smoking 

dependence)


Gastrointestinal History: Reports: Cholelithiasis


Other Gastrointestinal History: elevated LFTs


Neurological History: Reports: Other (See Below) (Frequent falls)


Endocrine/Metabolic History: Reports: Hypothyroidism, Other (See Below) (Hyopnat

remia)


Hematologic History: Reports: Anemia, Other (See Below)


Other Hematologic History: iron deficiency anemia





- Past Surgical History


Musculoskeletal Surgical History: Reports: Hip Replacement, Other (See Below)


Other Musculoskeletal Surgeries/Procedures:: rt tka





Social & Family History





- Family History


Family Medical History: Noncontributory





- Tobacco Use


Smoking Status *Q: Current Every Day Smoker


Tobacco Use Within Last Twelve Months: Cigarettes





- Caffeine Use


Caffeine Use: Reports: Coffee





- Living Situation & Occupation


Living situation: Reports: Single, Alone


Occupation: Retired





ED ROS GENERAL





- Review of Systems


Review Of Systems: Comprehensive ROS is negative, except as noted in HPI.





ED EXAM, HEAD INJURY





- Physical Exam


Exam: See Below


Exam Limited By: No Limitations


General Appearance: Alert, WD/WN, No Apparent Distress


Head: Normocephalic, Scalp Lacerations (2cm linear laceration to depth of 

subcutaneous tissue at occipital scalp, no active bleeding.)


Nexus Criteria: No: Posterior, Midline Cervical Tenderness, Evidence of 

Intoxication, Altered Level of Consciousness, Focal Neurological Deficit, 

Painful Distraction Injuries


Eyes: Bilateral Eye: EOMI, Nystagmus (lateral gaze ), PERRL


Ears: Normal External Exam, Normal Canal, Hearing Grossly Normal, Normal TMs


Nose: Normal Inspection, Normal Mucousa, No Blood


Throat/Mouth: Normal Inspection, Normal Lips, Normal Voice, No Airway Compromise


Neck: Non-Tender, Full Range of Motion, Normal Alignment, Normal Inspection


Respiratory: No Respiratory Distress


Cardiovascular: Regular Rate, Rhythm, Tachycardia


GI/Abdominal Exam: Normal Bowel Sounds, Soft, Non-Tender, Pelvis Stable


Back Exam: Normal Inspection, Full Range of Motion


Extremities: Normal Inspection, Normal Range of Motion, Non-Tender, No Pedal 

Edema, Normal Capillary Refill


Neurologic: CNs II-XII nml As Tested, No Motor/Sensory Deficits, Alert, Normal 

Mood/Affect, Oriented x 3, Other (Reproducible vertiginous dizziness with head 

movement.)


Skin: Normal Color, Warm/Dry





- Keli Coma Score


Best Eye Response ([place]): (4) Open Spontaneously


Best Verbal Response (Keli): (5) Oriented


Best Motor Response (Keli): (6) Obeys Commands


[place] Total: 15





ED LACERATION/WOUND & LUDMILA PROC





- Laceration/Wound Repair


  ** Occipital Head


Lac/wound length in cm: 2.5


Appearance: Subcutaneous, Irregular


Distal NVT: Neuro & Vascular Intact


Anesthetic Type: Local


Local Anesthesia - Lidocaine (Xylocaine): 1% Plain


Local Anesthetic Volume: Other (10cc)


Skin Prep: Chlorhexidine (Hibiciens), Saline, Sterile Drape


Saline irrigation (cc's): 250


Exploration/Debridement/Repair: Wound Explored, In a Bloodless Field, Explored 

to Base, Moderate Debridement, Moderately Undermined, No Foreign Material Found


Closed with: Sutures


Suture Size: 2-0


# of Sutures: 4


Suture Type: Prolene, Interrupted


Drain Placement: No


Sterile Dressing Applied: None


Tetanus Status Addressed: Yes


Complications: No





EKG INTERPRETATION


EKG Date: 08/10/20


Time: 07:55


Rhythm: Other (SR)


Rate (Beats/Min): 94


Axis: LAD-Left Axis Deviation


P-Wave: Present


QRS: Other (Old inferior Q waves, abnormal R-wave progression late transition)


ST-T: Normal


QT: Normal


Comparison: NA - No Prior EKG





Course





- Vital Signs


Last Recorded V/S: 


                                Last Vital Signs











Temp  98.3 F   08/10/20 07:56


 


Pulse  106 H  08/10/20 07:56


 


Resp  16   08/10/20 07:56


 


BP  112/74   08/10/20 07:56


 


Pulse Ox  99   08/10/20 07:56














- Orders/Labs/Meds


Orders: 


                               Active Orders 24 hr











 Category Date Time Status


 


 EKG 12 Lead [EKG Documentation Completion] [RC] STAT Care  08/10/20 07:52 

Active


 


 Vaccines to be Administered [RC] PER UNIT ROUTINE Care  08/10/20 08:20 Active


 


 DRUG SCREEN URINE BIORAD [URCHEM] Stat Lab  08/10/20 09:15 Received


 


 UA RFX DAKOTA AND CULT IF INDIC [URIN] Stat Lab  08/10/20 09:15 Received











Labs: 


                                Laboratory Tests











  08/10/20 08/10/20 Range/Units





  08:04 08:04 


 


WBC  6.4   (5.0-10.0)  10^3/uL


 


RBC  4.36   (4.2-5.4)  10^6/uL


 


Hgb  13.9  D   (12.0-16.0)  g/dL


 


Hct  41.4   (37.0-47.0)  %


 


MCV  95.0   ()  fL


 


MCH  31.9   (27.0-34.0)  pg


 


MCHC  33.6   (33.0-35.0)  g/dL


 


Plt Count  184   (150-450)  10^3/uL


 


Neut % (Auto)  75.0   (42.2-75.2)  %


 


Lymph % (Auto)  12.5 L   (20.5-50.1)  %


 


Mono % (Auto)  10.3 H   (2-8)  %


 


Eos % (Auto)  1.9   (1.0-3.0)  %


 


Baso % (Auto)  0.3   (0.0-1.0)  %


 


Sodium   137  (136-145)  mmol/L


 


Potassium   4.0  (3.5-5.1)  mmol/L


 


Chloride   100  ()  mmol/L


 


Carbon Dioxide   29  (21-32)  mmol/L


 


Anion Gap   12.0  (7-13)  mEq/L


 


BUN   9  (7-18)  mg/dL


 


Creatinine   1.01  (0.55-1.02)  mg/dL


 


Est Cr Clr Drug Dosing   42.87  mL/min


 


Estimated GFR (MDRD)   54  


 


BUN/Creatinine Ratio   8.9  (No establ ref range)  


 


Glucose   90  (74-99)  mg/dL


 


Calcium   8.6  (8.5-10.1)  mg/dL


 


Total Bilirubin   0.4  (0.2-1.0)  mg/dL


 


AST   23  (15-37)  U/L


 


ALT   23  (14-59)  U/L


 


Alkaline Phosphatase   95  ()  U/L


 


Total Protein   6.1 L  (6.4-8.2)  g/dL


 


Albumin   2.9 L  (3.4-5.0)  g/dL


 


Globulin   3.2  


 


Albumin/Globulin Ratio   0.91  


 


Ethyl Alcohol   < 3  (0)  mg/dL











Meds: 


Medications














Discontinued Medications














Generic Name Dose Route Start Last Admin





  Trade Name Abiola  PRN Reason Stop Dose Admin


 


Diphtheria/Tetanus/Acell Pertussis  0.5 ml  08/10/20 08:20  08/10/20 08:34





  Adacel  IM  08/10/20 08:21  0.5 ml





  .ONCE ONE   Administration


 


Meclizine HCl  25 mg  08/10/20 08:20  08/10/20 08:34





  Antivert  PO  08/10/20 08:21  25 mg





  ONETIME ONE   Administration














- Radiology Interpretation


Free Text/Narrative:: 


[facility]


Final Radiology Report  Call: [phone]


assistance Online chat: [email or web address]


Name: ARMANDO SPAIN Age: 70Years F Date: 08/10/2020


MRN: [id number] SSN: -- : [id number]


Study: CT HEAD WO CONT Requesting Physician: ELENITA KIMBALL


Accession: [id number] Images: [id number]


Addl Studies:


Provided Clinical History: Fall, dizziness, head injury


Contrast: Without Contrast Medium:


Contrast Amount: Contrast Method:


Page 1 of 2


PROCEDURE INFORMATION:


Exam: CT Head Without Contrast


Exam date and time: 8/10/2020 8:00 AM


Age: 70 years old


Clinical indication: Other: Fall, dizziness, head injury


TECHNIQUE:


Imaging protocol: Computed tomography of the head without contrast.


Radiation optimization: All CT scans at this facility use at least one of these 

dose optimization


techniques: automated exposure control; mA and/or kV adjustment per patient size

(includes targeted


exams where dose is matched to clinical indication); or iterative 

reconstruction.


COMPARISON:


CT Head wo Cont 2020 5:38 PM


FINDINGS:


Brain: Moderate hypoattenuating foci are noted in the posterior superior 

periatrial and anterior lateral


ventricular periventricular white matter bilaterally. No intracranial 

hemorrhage. No mass or acute


cortical infarction identified.


Ventricles: Prominence of the ventricular system and subarachnoid spaces is 

consistent with the


patient's age of 70 years.


Bones/joints: Unremarkable. No acute fracture.


Sinuses: 5 mm benign posterior inferior right maxillary sinus mucosal 

calcification.


Mastoid air cells: Visualized mastoid air cells are well aerated.


Orbits: Bilateral prior cataract surgery.


Vasculature: Atherosclerotic calcifications are present involving the carotid 

artery siphons bilaterally.


Soft tissues: Left occipital subcutaneous edema.


Nasal cavity: 12 mm cartilaginous nasal septal fenestration.


IMPRESSION:


ARMANDO SPAIN Accession: [id number] MRN:[id number] | Final Radiology Report


CONFIDENTIALITY STATEMENT


This report is intended only for use by the referring physician, and only in 

accordance with law. If you received this in error, call [phone].


Page 2 of 2


1. Age appropriate supratentorial and infratentorial atrophy.


2. Moderate chronic white matter microvascular ischemic disease.


3. No acute intracranial injury identified.


Thank you for allowing us to participate in the care of your patient.


Dictated and Authenticated by: Lazaro Pollock MD


08/10/2020 8:20 AM Central Time ([place] & [place])








- Re-Assessments/Exams


Free Text/Narrative Re-Assessment/Exam: 





08/10/20 09:28


Pt with intractable vertigo, unable to ambulate without full assist. Plan to 

admit to obs. to Dr. Salomon.





Departure





- Departure


Time of Disposition: 09:28 (admitted to Dr. Salomon)


Disposition: Refer to Observation


Condition: Fair


Clinical Impression: 


 Vertigo, Multiple falls





Occipital scalp laceration


Qualifiers:


 Encounter type: initial encounter Qualified Code(s): S01.01XA - Laceration 

without foreign body of scalp, initial encounter








- Discharge Information


*PRESCRIPTION DRUG MONITORING PROGRAM REVIEWED*: Not Applicable


*COPY OF PRESCRIPTION DRUG MONITORING REPORT IN PATIENT ELAINA: Not Applicable


Forms:  ED Department Discharge





Sepsis Event Note (ED)





- Focused Exam


Vital Signs: 


                                   Vital Signs











  Temp Pulse Resp BP Pulse Ox


 


 08/10/20 07:56  98.3 F  106 H  16  112/74  99














- My Orders


Last 24 Hours: 


My Active Orders





08/10/20 07:52


EKG 12 Lead [EKG Documentation Completion] [RC] STAT 





08/10/20 08:20


Vaccines to be Administered [RC] PER UNIT ROUTINE 





08/10/20 09:15


DRUG SCREEN URINE BIORAD [URCHEM] Stat 


UA RFX DAKOTA AND CULT IF INDIC [URIN] Stat 














- Assessment/Plan


Last 24 Hours: 


My Active Orders





08/10/20 07:52


EKG 12 Lead [EKG Documentation Completion] [RC] STAT 





08/10/20 08:20


Vaccines to be Administered [RC] PER UNIT ROUTINE 





08/10/20 09:15


DRUG SCREEN URINE BIORAD [URCHEM] Stat 


UA RFX DAKOTA AND CULT IF INDIC [URIN] Stat

## 2020-08-10 NOTE — [id number].[id number]
**Discharge Summary





- Hospital Course


Free Text/Narrative:: 








70-year-old with a history of COPD, frequent falls. The patient presented the 

after a fall.


No loss of consciousness, no syncopal. Nevertheless she did hit her head.





 Laceration was repaired in the ER. - will neet sutre removal in 10-14 days





CT head showed no acute bleeding or other injury.


initially she reported vertigo which resolved


.





COPD appears controlled.





Hypertension


Continue Norvasc and ACE inhibitor





Diagnosis: Stroke: No





- Discharge Data


Discharge Date: 08/10/20


Discharge Disposition: Home, Self-Care 01


Condition: Good





- Referral to Home Health


Primary Care Physician: 


PCP Unobtainable








- Discharge Diagnosis/Problem(s)


(1) Vertigo


SNOMED Code(s): 171304197


   ICD Code: R42 - DIZZINESS AND GIDDINESS   Status: Acute   Current Visit: No  







- Patient Instructions


Diet: Heart Healthy Diet


Activity: As Tolerated





- Discharge Plan


*PRESCRIPTION DRUG MONITORING PROGRAM REVIEWED*: Not Applicable


*COPY OF PRESCRIPTION DRUG MONITORING REPORT IN PATIENT ELAINA: Not Applicable


Home Medications: 


                                    Home Meds





Amitriptyline [Elavil] 300 mg PO BEDTIME 12/15/14 [History]


Calcium Citrate/Vitamin D3 [Calcium Cit 200 mg-D3 125 Unit] 1 tab PO DAILY 

12/15/14 [History]


Estrogens, Conjugated [Premarin] 0.625 mg PO DAILY 12/15/14 [History]


Alendronate Sodium 70 mg PO .WED 08/10/20 [History]


DULoxetine HCl [Duloxetine HCl] 30 mg PO DAILY 08/10/20 [History]


Enalapril Maleate 40 mg PO DAILY 08/10/20 [History]


Glycopyrrolate 2 mg PO BID 08/10/20 [History]


Levothyroxine [Synthroid] 100 mcg PO ACBREAKFAST 08/10/20 [History]


Multivit-Min/FA/Lycopen/Lutein [Sentry Senior Tablet] 1 tab PO ACBREAKFAST 

08/10/20 [History]


Piroxicam 20 mg PO DAILY 08/10/20 [History]


amLODIPine Besylate [Amlodipine Besylate] 10 mg PO DAILY 08/10/20 [History]








Patient Handouts:  Fall Prevention in the Home, Adult, Easy-to-Read, Sutures, 

Staples, or Adhesive Wound Closure, Easy-to-Read





- Discharge Summary/Plan Comment


DC Time >30 min.: No





- General Info


Date of Service: 08/10/20





- Review of Systems


General: Denies: Fever, Weakness


Gastrointestinal: Denies: Abdominal Pain


Neurological: Denies: Confusion, Seizure, Syncope, Difficulty Walking, Weakness


Psychiatric: Denies: Confusion





- Patient Data


Vitals - Most Recent: 


                                Last Vital Signs











Temp  98.2 F   08/10/20 12:39


 


Pulse  95   08/10/20 12:39


 


Resp  16   08/10/20 12:39


 


BP  118/86   08/10/20 12:39


 


Pulse Ox  95   08/10/20 12:39











Weight - Most Recent: 155 lb 1.6 oz


I&O - Last 24 hours: 


                                 Intake & Output











 08/10/20 08/10/20 08/10/20





 06:59 14:59 22:59


 


Intake Total  700 


 


Output Total  400 


 


Balance  300 











Lab Results - Last 24 hrs: 


                         Laboratory Results - last 24 hr











  08/10/20 08/10/20 08/10/20 Range/Units





  08:04 08:04 09:15 


 


WBC  6.4    (5.0-10.0)  10^3/uL


 


RBC  4.36    (4.2-5.4)  10^6/uL


 


Hgb  13.9  D    (12.0-16.0)  g/dL


 


Hct  41.4    (37.0-47.0)  %


 


MCV  95.0    ()  fL


 


MCH  31.9    (27.0-34.0)  pg


 


MCHC  33.6    (33.0-35.0)  g/dL


 


Plt Count  184    (150-450)  10^3/uL


 


Neut % (Auto)  75.0    (42.2-75.2)  %


 


Lymph % (Auto)  12.5 L    (20.5-50.1)  %


 


Mono % (Auto)  10.3 H    (2-8)  %


 


Eos % (Auto)  1.9    (1.0-3.0)  %


 


Baso % (Auto)  0.3    (0.0-1.0)  %


 


Sodium   137   (136-145)  mmol/L


 


Potassium   4.0   (3.5-5.1)  mmol/L


 


Chloride   100   ()  mmol/L


 


Carbon Dioxide   29   (21-32)  mmol/L


 


Anion Gap   12.0   (7-13)  mEq/L


 


BUN   9   (7-18)  mg/dL


 


Creatinine   1.01   (0.55-1.02)  mg/dL


 


Est Cr Clr Drug Dosing   42.87   mL/min


 


Estimated GFR (MDRD)   54   


 


BUN/Creatinine Ratio   8.9   (No establ ref range)  


 


Glucose   90   (74-99)  mg/dL


 


Calcium   8.6   (8.5-10.1)  mg/dL


 


Total Bilirubin   0.4   (0.2-1.0)  mg/dL


 


AST   23   (15-37)  U/L


 


ALT   23   (14-59)  U/L


 


Alkaline Phosphatase   95   ()  U/L


 


Total Protein   6.1 L   (6.4-8.2)  g/dL


 


Albumin   2.9 L   (3.4-5.0)  g/dL


 


Globulin   3.2   


 


Albumin/Globulin Ratio   0.91   


 


Urine Color    Yellow  (YELLOW)  


 


Urine Appearance    Clear  (CLEAR)  


 


Urine pH    7.0  (5.0-9.0)  


 


Ur Specific Gravity    1.015  (1.005-1.030)  


 


Urine Protein    Negative  (NEGATIVE)  


 


Urine Glucose (UA)    Negative  (NEGATIVE)  


 


Urine Ketones    Negative  (NEGATIVE)  


 


Urine Occult Blood    Trace-intact H  (NEGATIVE)  


 


Urine Nitrite    Negative  (NEGATIVE)  


 


Urine Bilirubin    Negative  (NEGATIVE)  


 


Urine Urobilinogen    0.2  (0.2-1.0)  mg/dL


 


Ur Leukocyte Esterase    Trace H  (NEGATIVE)  


 


Urine RBC    0-5  /HPF


 


Urine WBC    0-5  (0-5/HPF)  /HPF


 


Ur Epithelial Cells    Few  (NOT SEEN)  /HPF


 


Amorphous Sediment    Few  (NOT SEEN)  /HPF


 


Urine Bacteria    Few  (0-FEW/HPF)  /HPF


 


Urine Opiates Screen     (NEGATIVE)  


 


Ur Oxycodone Screen     (NEGATIVE)  


 


Urine Methadone Screen     (NEGATIVE)  


 


Ur Barbiturates Screen     (NEGATIVE)  


 


U Tricyclic Antidepress     (NEGATIVE)  


 


Ur Phencyclidine Scrn     (NEGATIVE)  


 


Ur Amphetamine Screen     (NEGATIVE)  


 


U Methamphetamines Scrn     (NEGATIVE)  


 


Urine MDMA Screen     (NEGATIVE)  


 


U Benzodiazepines Scrn     (NEGATIVE)  


 


Urine Cocaine Screen     (NEGATIVE)  


 


U Marijuana (THC) Screen     (NEGATIVE)  


 


Ethyl Alcohol   < 3   (0)  mg/dL














  08/10/20 Range/Units





  09:15 


 


WBC   (5.0-10.0)  10^3/uL


 


RBC   (4.2-5.4)  10^6/uL


 


Hgb   (12.0-16.0)  g/dL


 


Hct   (37.0-47.0)  %


 


MCV   ()  fL


 


MCH   (27.0-34.0)  pg


 


MCHC   (33.0-35.0)  g/dL


 


Plt Count   (150-450)  10^3/uL


 


Neut % (Auto)   (42.2-75.2)  %


 


Lymph % (Auto)   (20.5-50.1)  %


 


Mono % (Auto)   (2-8)  %


 


Eos % (Auto)   (1.0-3.0)  %


 


Baso % (Auto)   (0.0-1.0)  %


 


Sodium   (136-145)  mmol/L


 


Potassium   (3.5-5.1)  mmol/L


 


Chloride   ()  mmol/L


 


Carbon Dioxide   (21-32)  mmol/L


 


Anion Gap   (7-13)  mEq/L


 


BUN   (7-18)  mg/dL


 


Creatinine   (0.55-1.02)  mg/dL


 


Est Cr Clr Drug Dosing   mL/min


 


Estimated GFR (MDRD)   


 


BUN/Creatinine Ratio   (No establ ref range)  


 


Glucose   (74-99)  mg/dL


 


Calcium   (8.5-10.1)  mg/dL


 


Total Bilirubin   (0.2-1.0)  mg/dL


 


AST   (15-37)  U/L


 


ALT   (14-59)  U/L


 


Alkaline Phosphatase   ()  U/L


 


Total Protein   (6.4-8.2)  g/dL


 


Albumin   (3.4-5.0)  g/dL


 


Globulin   


 


Albumin/Globulin Ratio   


 


Urine Color   (YELLOW)  


 


Urine Appearance   (CLEAR)  


 


Urine pH   (5.0-9.0)  


 


Ur Specific Gravity   (1.005-1.030)  


 


Urine Protein   (NEGATIVE)  


 


Urine Glucose (UA)   (NEGATIVE)  


 


Urine Ketones   (NEGATIVE)  


 


Urine Occult Blood   (NEGATIVE)  


 


Urine Nitrite   (NEGATIVE)  


 


Urine Bilirubin   (NEGATIVE)  


 


Urine Urobilinogen   (0.2-1.0)  mg/dL


 


Ur Leukocyte Esterase   (NEGATIVE)  


 


Urine RBC   /HPF


 


Urine WBC   (0-5/HPF)  /HPF


 


Ur Epithelial Cells   (NOT SEEN)  /HPF


 


Amorphous Sediment   (NOT SEEN)  /HPF


 


Urine Bacteria   (0-FEW/HPF)  /HPF


 


Urine Opiates Screen  Positive H  (NEGATIVE)  


 


Ur Oxycodone Screen  Negative  (NEGATIVE)  


 


Urine Methadone Screen  Negative  (NEGATIVE)  


 


Ur Barbiturates Screen  Negative  (NEGATIVE)  


 


U Tricyclic Antidepress  Positive H  (NEGATIVE)  


 


Ur Phencyclidine Scrn  Negative  (NEGATIVE)  


 


Ur Amphetamine Screen  Negative  (NEGATIVE)  


 


U Methamphetamines Scrn  Negative  (NEGATIVE)  


 


Urine MDMA Screen  Negative  (NEGATIVE)  


 


U Benzodiazepines Scrn  Negative  (NEGATIVE)  


 


Urine Cocaine Screen  Negative  (NEGATIVE)  


 


U Marijuana (THC) Screen  Negative  (NEGATIVE)  


 


Ethyl Alcohol   (0)  mg/dL











Med Orders - Current: 


                               Current Medications





Acetaminophen (Tylenol)  650 mg PO Q4H PRN


   PRN Reason: Pain (Mild 1-3)/fever


Amlodipine Besylate (Norvasc)  10 mg PO DAILY KIRA


Duloxetine HCl (Cymbalta)  30 mg PO DAILY KIRA


Heparin Sodium (Porcine) (Heparin Sodium)  5,000 units SUBCUT Q8HR KIRA


Ibuprofen (Motrin)  400 mg PO Q6H PRN


   PRN Reason: Pain (moderate 4-6)


Levothyroxine Sodium (Synthroid)  100 mcg PO ACBREAKFAST KIRA


Lisinopril (Prinivil)  20 mg PO DAILY KIRA


Meclizine HCl (Antivert)  25 mg PO TID PRN


   PRN Reason: vertigo


Amitriptyline [ (Elavil] 150 Mg Tab)  300 mg PO BEDTIME KIRA


Ondansetron HCl (Zofran)  4 mg IVPUSH Q6H PRN


   PRN Reason: Nausea/Vomiting


Sodium Chloride (Saline Flush)  10 ml FLUSH ASDIRECTED PRN


   PRN Reason: Keep Vein Open





Discontinued Medications





Diphtheria/Tetanus/Acell Pertussis (Adacel)  0.5 ml IM .ONCE ONE


   Stop: 08/10/20 08:21


   Last Admin: 08/10/20 08:34 Dose:  0.5 ml


   Documented by: 


Meclizine HCl (Antivert)  25 mg PO ONETIME ONE


   Stop: 08/10/20 08:21


   Last Admin: 08/10/20 08:34 Dose:  25 mg


   Documented by: 











- Exam


General: Reports: Alert, Oriented


Neck: Reports: Supple


Lungs: Reports: Clear to Auscultation, Normal Respiratory Effort


Cardiovascular: Reports: Regular Rate, Regular Rhythm


GI/Abdominal Exam: Normal Bowel Sounds, Soft, Non-Tender


Extremities: No Pedal Edema

## 2020-08-10 NOTE — CT
PROCEDURE INFORMATION: 

Exam: CT Head Without Contrast 

Exam date and time: 8/10/2020 8:00 AM 

Age: 70 years old 

Clinical indication: Other: Fall, dizziness, head injury 



TECHNIQUE: 

Imaging protocol: Computed tomography of the head without contrast. 

Radiation optimization: All CT scans at this facility use at least one of these 

dose optimization techniques: automated exposure control; mA and/or kV 

adjustment per patient size (includes targeted exams where dose is matched to 

clinical indication); or iterative reconstruction. 



COMPARISON: 

CT Head wo Cont 5/29/2020 5:38 PM 



FINDINGS: 

Brain: Moderate hypoattenuating foci are noted in the posterior superior 

periatrial and anterior lateral ventricular periventricular white matter 

bilaterally. No intracranial hemorrhage. No mass or acute cortical infarction 

identified. 

Ventricles: Prominence of the ventricular system and subarachnoid spaces is 

consistent with the patient's age of 70 years. 

Bones/joints: Unremarkable. No acute fracture. 

Sinuses: 5 mm benign posterior inferior right maxillary sinus mucosal 

calcification. 

Mastoid air cells: Visualized mastoid air cells are well aerated. 

Orbits: Bilateral prior cataract surgery. 

Vasculature: Atherosclerotic calcifications are present involving the carotid 

artery siphons bilaterally. 

Soft tissues: Left occipital subcutaneous edema. 



Nasal cavity: 12 mm cartilaginous nasal septal fenestration. 



IMPRESSION: 

1. Age appropriate supratentorial and infratentorial atrophy. 

2. Moderate chronic white matter microvascular ischemic disease. 

3. No acute intracranial injury identified.

## 2020-08-10 NOTE — PCM.HP
H&P History of Present Illness





- General


Date of Service: 08/10/20


Admit Problem/Dx: 


                           Admission Diagnosis/Problem





Admission Diagnosis/Problem      Dizziness








Source of Information: Patient, Provider (ER)





- History of Present Illness


Initial Comments - Free Text/Narative: 





The patient has been doing well recently, no recent chest pain, no chest 

shortness of breath.


She presented to the ER following a fall. She hit her head but did not have 

associated loss of consciousness. In the ER she required sutures in the back of 

the head for laceration. She was noted to have vertigo when standing up.


Otherwise she denies other symptoms. The vertigo appears improving. There is 

still some mother at the pain in the back of the head.


  ** Head


Pain Score (Numeric/FACES): 7





- Related Data


Allergies/Adverse Reactions: 


                                    Allergies











Allergy/AdvReac Type Severity Reaction Status Date / Time


 


amoxicillin trihydrate Allergy  Nausea Verified 08/10/20 09:45





[From Augmentin]     


 


potassium clavulanate Allergy  Nausea Verified 08/10/20 09:45





[From Augmentin]     


 


codeine AdvReac Mild Vomiting Verified 08/10/20 09:45











Home Medications: 


                                    Home Meds





Amitriptyline [Elavil] 300 mg PO BEDTIME 12/15/14 [History]


Calcium Citrate/Vitamin D3 [Calcium Cit 200 mg-D3 125 Unit] 1 tab PO DAILY 

12/15/14 [History]


Estrogens, Conjugated [Premarin] 0.625 mg PO DAILY 12/15/14 [History]


Alendronate Sodium 70 mg PO .WED 08/10/20 [History]


DULoxetine HCl [Duloxetine HCl] 30 mg PO DAILY 08/10/20 [History]


Enalapril Maleate 40 mg PO DAILY 08/10/20 [History]


Glycopyrrolate 2 mg PO BID 08/10/20 [History]


Levothyroxine [Synthroid] 100 mcg PO ACBREAKFAST 08/10/20 [History]


Multivit-Min/FA/Lycopen/Lutein [Sentry Senior Tablet] 1 tab PO ACBREAKFAST 

08/10/20 [History]


Piroxicam 20 mg PO DAILY 08/10/20 [History]


amLODIPine Besylate [Amlodipine Besylate] 10 mg PO DAILY 08/10/20 [History]











Past Medical History


Respiratory History: Reports: COPD, Other (See Below) (Chronic tobacco smoking 

dependence)


Gastrointestinal History: Reports: Cholelithiasis


Other Gastrointestinal History: elevated LFTs


Neurological History: Reports: Other (See Below) (Frequent falls)


Endocrine/Metabolic History: Reports: Hypothyroidism, Other (See Below) 

(Hyopnatremia)


Hematologic History: Reports: Anemia, Other (See Below)


Other Hematologic History: iron deficiency anemia





- Past Surgical History


Musculoskeletal Surgical History: Reports: Hip Replacement, Other (See Below)


Other Musculoskeletal Surgeries/Procedures:: rt tka





Social & Family History





- Family History


Family Medical History: Noncontributory





- Tobacco Use


Smoking Status *Q: Current Every Day Smoker


Years of Tobacco use: 50


Packs/Tins Daily: 1


Used Tobacco, but Quit: No





- Caffeine Use


Caffeine Use: Reports: Coffee





- Recreational Drug Use


Recreational Drug Use: No





- Living Situation & Occupation


Living situation: Reports: Single, Alone


Occupation: Retired





H&P Review of Systems





- Review of Systems:


Review Of Systems: See Below


General: Denies: Fever


Pulmonary: Denies: Shortness of Breath, Hemoptysis


Cardiovascular: Denies: Chest Pain, Edema


Gastrointestinal: Denies: Abdominal Pain


Psychiatric: Denies: Confusion


Neurological: Reports: Dizziness, Headache, Difficulty Walking (Unsteady).  

Denies: Confusion, Trouble Speaking





Exam





- Exam


Exam: See Below





- Vital Signs


Vital Signs: 


                                Last Vital Signs











Temp  97.9 F   08/10/20 09:45


 


Pulse  97   08/10/20 09:45


 


Resp  18   08/10/20 09:45


 


BP  138/97 H  08/10/20 09:45


 


Pulse Ox  96   08/10/20 09:45











Weight: 155 lb 1.6 oz





- Exam


General: Alert, Oriented


HEENT: Other (Laceration in the back of the head repaired in the ER)


Neck: Supple


Lungs: Clear to Auscultation, Normal Respiratory Effort


Cardiovascular: Regular Rate, Regular Rhythm


GI/Abdominal Exam: Normal Bowel Sounds, Soft, Non-Tender


Extremities: No Pedal Edema





- Patient Data


Lab Results Last 24 hrs: 


                         Laboratory Results - last 24 hr











  08/10/20 08/10/20 08/10/20 Range/Units





  08:04 08:04 09:15 


 


WBC  6.4    (5.0-10.0)  10^3/uL


 


RBC  4.36    (4.2-5.4)  10^6/uL


 


Hgb  13.9  D    (12.0-16.0)  g/dL


 


Hct  41.4    (37.0-47.0)  %


 


MCV  95.0    ()  fL


 


MCH  31.9    (27.0-34.0)  pg


 


MCHC  33.6    (33.0-35.0)  g/dL


 


Plt Count  184    (150-450)  10^3/uL


 


Neut % (Auto)  75.0    (42.2-75.2)  %


 


Lymph % (Auto)  12.5 L    (20.5-50.1)  %


 


Mono % (Auto)  10.3 H    (2-8)  %


 


Eos % (Auto)  1.9    (1.0-3.0)  %


 


Baso % (Auto)  0.3    (0.0-1.0)  %


 


Sodium   137   (136-145)  mmol/L


 


Potassium   4.0   (3.5-5.1)  mmol/L


 


Chloride   100   ()  mmol/L


 


Carbon Dioxide   29   (21-32)  mmol/L


 


Anion Gap   12.0   (7-13)  mEq/L


 


BUN   9   (7-18)  mg/dL


 


Creatinine   1.01   (0.55-1.02)  mg/dL


 


Est Cr Clr Drug Dosing   42.87   mL/min


 


Estimated GFR (MDRD)   54   


 


BUN/Creatinine Ratio   8.9   (No establ ref range)  


 


Glucose   90   (74-99)  mg/dL


 


Calcium   8.6   (8.5-10.1)  mg/dL


 


Total Bilirubin   0.4   (0.2-1.0)  mg/dL


 


AST   23   (15-37)  U/L


 


ALT   23   (14-59)  U/L


 


Alkaline Phosphatase   95   ()  U/L


 


Total Protein   6.1 L   (6.4-8.2)  g/dL


 


Albumin   2.9 L   (3.4-5.0)  g/dL


 


Globulin   3.2   


 


Albumin/Globulin Ratio   0.91   


 


Urine Color    Yellow  (YELLOW)  


 


Urine Appearance    Clear  (CLEAR)  


 


Urine pH    7.0  (5.0-9.0)  


 


Ur Specific Gravity    1.015  (1.005-1.030)  


 


Urine Protein    Negative  (NEGATIVE)  


 


Urine Glucose (UA)    Negative  (NEGATIVE)  


 


Urine Ketones    Negative  (NEGATIVE)  


 


Urine Occult Blood    Trace-intact H  (NEGATIVE)  


 


Urine Nitrite    Negative  (NEGATIVE)  


 


Urine Bilirubin    Negative  (NEGATIVE)  


 


Urine Urobilinogen    0.2  (0.2-1.0)  mg/dL


 


Ur Leukocyte Esterase    Trace H  (NEGATIVE)  


 


Urine RBC    0-5  /HPF


 


Urine WBC    0-5  (0-5/HPF)  /HPF


 


Ur Epithelial Cells    Few  (NOT SEEN)  /HPF


 


Amorphous Sediment    Few  (NOT SEEN)  /HPF


 


Urine Bacteria    Few  (0-FEW/HPF)  /HPF


 


Urine Opiates Screen     (NEGATIVE)  


 


Ur Oxycodone Screen     (NEGATIVE)  


 


Urine Methadone Screen     (NEGATIVE)  


 


Ur Barbiturates Screen     (NEGATIVE)  


 


U Tricyclic Antidepress     (NEGATIVE)  


 


Ur Phencyclidine Scrn     (NEGATIVE)  


 


Ur Amphetamine Screen     (NEGATIVE)  


 


U Methamphetamines Scrn     (NEGATIVE)  


 


Urine MDMA Screen     (NEGATIVE)  


 


U Benzodiazepines Scrn     (NEGATIVE)  


 


Urine Cocaine Screen     (NEGATIVE)  


 


U Marijuana (THC) Screen     (NEGATIVE)  


 


Ethyl Alcohol   < 3   (0)  mg/dL














  08/10/20 Range/Units





  09:15 


 


WBC   (5.0-10.0)  10^3/uL


 


RBC   (4.2-5.4)  10^6/uL


 


Hgb   (12.0-16.0)  g/dL


 


Hct   (37.0-47.0)  %


 


MCV   ()  fL


 


MCH   (27.0-34.0)  pg


 


MCHC   (33.0-35.0)  g/dL


 


Plt Count   (150-450)  10^3/uL


 


Neut % (Auto)   (42.2-75.2)  %


 


Lymph % (Auto)   (20.5-50.1)  %


 


Mono % (Auto)   (2-8)  %


 


Eos % (Auto)   (1.0-3.0)  %


 


Baso % (Auto)   (0.0-1.0)  %


 


Sodium   (136-145)  mmol/L


 


Potassium   (3.5-5.1)  mmol/L


 


Chloride   ()  mmol/L


 


Carbon Dioxide   (21-32)  mmol/L


 


Anion Gap   (7-13)  mEq/L


 


BUN   (7-18)  mg/dL


 


Creatinine   (0.55-1.02)  mg/dL


 


Est Cr Clr Drug Dosing   mL/min


 


Estimated GFR (MDRD)   


 


BUN/Creatinine Ratio   (No establ ref range)  


 


Glucose   (74-99)  mg/dL


 


Calcium   (8.5-10.1)  mg/dL


 


Total Bilirubin   (0.2-1.0)  mg/dL


 


AST   (15-37)  U/L


 


ALT   (14-59)  U/L


 


Alkaline Phosphatase   ()  U/L


 


Total Protein   (6.4-8.2)  g/dL


 


Albumin   (3.4-5.0)  g/dL


 


Globulin   


 


Albumin/Globulin Ratio   


 


Urine Color   (YELLOW)  


 


Urine Appearance   (CLEAR)  


 


Urine pH   (5.0-9.0)  


 


Ur Specific Gravity   (1.005-1.030)  


 


Urine Protein   (NEGATIVE)  


 


Urine Glucose (UA)   (NEGATIVE)  


 


Urine Ketones   (NEGATIVE)  


 


Urine Occult Blood   (NEGATIVE)  


 


Urine Nitrite   (NEGATIVE)  


 


Urine Bilirubin   (NEGATIVE)  


 


Urine Urobilinogen   (0.2-1.0)  mg/dL


 


Ur Leukocyte Esterase   (NEGATIVE)  


 


Urine RBC   /HPF


 


Urine WBC   (0-5/HPF)  /HPF


 


Ur Epithelial Cells   (NOT SEEN)  /HPF


 


Amorphous Sediment   (NOT SEEN)  /HPF


 


Urine Bacteria   (0-FEW/HPF)  /HPF


 


Urine Opiates Screen  Positive H  (NEGATIVE)  


 


Ur Oxycodone Screen  Negative  (NEGATIVE)  


 


Urine Methadone Screen  Negative  (NEGATIVE)  


 


Ur Barbiturates Screen  Negative  (NEGATIVE)  


 


U Tricyclic Antidepress  Positive H  (NEGATIVE)  


 


Ur Phencyclidine Scrn  Negative  (NEGATIVE)  


 


Ur Amphetamine Screen  Negative  (NEGATIVE)  


 


U Methamphetamines Scrn  Negative  (NEGATIVE)  


 


Urine MDMA Screen  Negative  (NEGATIVE)  


 


U Benzodiazepines Scrn  Negative  (NEGATIVE)  


 


Urine Cocaine Screen  Negative  (NEGATIVE)  


 


U Marijuana (THC) Screen  Negative  (NEGATIVE)  


 


Ethyl Alcohol   (0)  mg/dL











Result Diagrams: 


                                 08/10/20 08:04





                                 08/10/20 08:04





- Problem List


(1) Vertigo


SNOMED Code(s): 269268632


   ICD Code: R42 - DIZZINESS AND GIDDINESS   Status: Acute   Current Visit: No  

 


Problem List Initiated/Reviewed/Updated: Yes


Orders Last 24hrs: 


                               Active Orders 24 hr











 Category Date Time Status


 


 Admission Diagnosis [ADT] Routine ADT  08/10/20 09:32 Ordered


 


 Admission Status [Patient Status] [ADT] Routine ADT  08/10/20 09:32 Active


 


 EKG 12 Lead [EKG Documentation Completion] [RC] STAT Care  08/10/20 07:52 

Active


 


 Vaccines to be Administered [RC] PER UNIT ROUTINE Care  08/10/20 08:20 Active


 


 CULTURE URINE [RM] Stat Lab  08/10/20 09:15 Received


 


 Amitriptyline [Elavil] Med  08/10/20 21:00 Ordered





 300 mg PO BEDTIME   


 


 DULoxetine [Cymbalta] Med  08/11/20 09:00 Pending





 30 mg PO DAILY   


 


 Levothyroxine [Synthroid] Med  08/11/20 06:00 Ordered





 100 mcg PO ACBREAKFAST   


 


 amLODIPine Besylate [Amlodipine Besylate] Med  08/11/20 09:00 Ordered





 10 mg PO DAILY   


 


 lisinopriL [Prinivil] Med  08/11/20 09:00 Ordered





 20 mg PO DAILY   








                                Medication Orders





Duloxetine HCl (Cymbalta)  30 mg PO DAILY [facility]


Levothyroxine Sodium (Synthroid)  100 mcg PO ACBREAKFAST KIRA


Lisinopril (Prinivil)  20 mg PO DAILY KIR[facility]


Non-Formulary Medication (Amitriptyline [Elavil])  300 mg PO BEDTIME [facility]IR[facility]


Non-Formulary Medication (Amlodipine Besylate [Amlodipine Besylate])  10 mg PO 

DAILY [facility]








Assessment/Plan Comment:: 





70-year-old with a history of COPD, frequent falls. The patient presented the 

after a fall.


No loss of consciousness, no syncopal. Nevertheless she did hit her head.





 Laceration was repaired in the ER.





CT head showed no acute bleeding or other injury.


We'll monitor the patient's for vertigo which might relate to the fall.





COPD appears controlled.





Hypertension


Continue Norvasc and ACE inhibitor





DVT prophylaxis with subcutaneous heparin

## 2020-08-18 NOTE — EDM.PDOC
ED HPI GENERAL MEDICAL PROBLEM





- General


Chief Complaint: Back Pain or Injury


Stated Complaint: [id number] BACK PAIN


Time Seen by Provider: 08/18/20 10:50


Source of Information: Reports: Patient


History Limitations: Reports: No Limitations





- History of Present Illness


INITIAL COMMENTS - FREE TEXT/NARRATIVE: 





This 71 yo female patient reports to the ED with lower back pain. The patient 

reports her lower back pain started this morning while she was standing in the 

kitchen. The patient reports her pain got better when she sits down, but comes 

right back when she stands up. The patient did take Tylenol while at home, but 

has had no symptom relief at this time. 


Onset: Today


Duration: Constant


Location: Reports: Back (lower)


Quality: Reports: Ache, Sharp, Stabbing


Severity: Severe


Improves with: Reports: None


Worsens with: Reports: None


Context: Reports: Other


Treatments PTA: Reports: Acetaminophen





- Related Data


                                    Allergies











Allergy/AdvReac Type Severity Reaction Status Date / Time


 


amoxicillin trihydrate Allergy  Nausea Verified 08/10/20 09:45





[From Augmentin]     


 


potassium clavulanate Allergy  Nausea Verified 08/10/20 09:45





[From Augmentin]     


 


codeine AdvReac Mild Vomiting Verified 08/10/20 09:45











Home Meds: 


                                    Home Meds





Amitriptyline [Elavil] 300 mg PO BEDTIME 12/15/14 [History]


Calcium Citrate/Vitamin D3 [Calcium Cit 200 mg-D3 125 Unit] 1 tab PO DAILY 

12/15/14 [History]


Estrogens, Conjugated [Premarin] 0.625 mg PO DAILY 12/15/14 [History]


Alendronate Sodium 70 mg PO .WED 08/10/20 [History]


DULoxetine HCl [Duloxetine HCl] 30 mg PO DAILY 08/10/20 [History]


Enalapril Maleate 40 mg PO DAILY 08/10/20 [History]


Glycopyrrolate 2 mg PO BID 08/10/20 [History]


Levothyroxine [Synthroid] 100 mcg PO ACBREAKFAST 08/10/20 [History]


Multivit-Min/FA/Lycopen/Lutein [Sentry Senior Tablet] 1 tab PO ACBREAKFAST 

08/10/20 [History]


Piroxicam 20 mg PO DAILY 08/10/20 [History]


amLODIPine Besylate [Amlodipine Besylate] 10 mg PO DAILY 08/10/20 [History]











Past Medical History


HEENT History: Reports: Other (See Below)


Other HEENT History: deviated septum


Cardiovascular History: Reports: Hypertension


Respiratory History: Reports: COPD, Other (See Below) (Chronic tobacco smoking 

dependence)


Gastrointestinal History: Reports: Cholelithiasis


Other Gastrointestinal History: elevated LFTs


OB/GYN History: Reports: Pregnancy


Musculoskeletal History: Reports: Fracture, Other (See Below)


Other Musculoskeletal History: ostopenea


Neurological History: Reports: Other (See Below) (Frequent falls)


Endocrine/Metabolic History: Reports: Hypothyroidism, Other (See Below) 

(Hyopnatremia)


Hematologic History: Reports: Anemia, Other (See Below)


Other Hematologic History: iron deficiency anemia





- Infectious Disease History


Infectious Disease History: Reports: Chicken Pox, Measles, Mumps





- Past Surgical History


Musculoskeletal Surgical History: Reports: Hip Replacement, Other (See Below)


Other Musculoskeletal Surgeries/Procedures:: rt tka





Social & Family History





- Family History


Family Medical History: Noncontributory





- Caffeine Use


Caffeine Use: Reports: Coffee





- Living Situation & Occupation


Living situation: Reports: Single, Alone


Occupation: Retired





ED ROS GENERAL





- Review of Systems


Review Of Systems: Comprehensive ROS is negative, except as noted in HPI.





ED EXAM,LOWER BACK PAIN/INJURY





- Physical Exam


Exam: See Below


Exam Limited By: No Limitations


General Appearance: Alert, WD/WN, Moderate Distress


Eye Exam: Bilateral Eye: EOMI, Normal Inspection, PERRL


Ears: Normal External Exam, Normal Canal, Hearing Grossly Normal, Normal TMs


Nose: Normal Inspection, Normal Mucosa, No Blood


Throat/Mouth: Normal Inspection, Normal Lips, Normal Teeth, Normal Gums, Normal 

Oropharynx, Normal Voice, No Airway Compromise


Head: Atraumatic, Normocephalic


Neck: Normal Inspection, Supple, Non-Tender, Full Range of Motion


Respiratory/Chest: No Respiratory Distress, Lungs Clear, Normal Breath Sounds, 

No Accessory Muscle Use, Chest Non-Tender


Cardiovascular: Normal Peripheral Pulses, Regular Rate, Rhythm, No Edema, No 

Gallop, No JVD, No Murmur, No Rub


GI/Abdominal: Normal Bowel Sounds, Soft, Non-Tender


 (Female) Exam: Deferred


Rectal (Female) Exam: Deferred


Back Exam: Paraspinal Tenderness (lower back)


Extremities: Normal Inspection, Normal Range of Motion, Non-Tender, No Pedal 

Edema, Normal Capillary Refill


Neurological: Alert, Normal Mood/Affect, CN II-XII Intact, Difficulty Walking 

(due to low back pain)


Psychiatric: Normal Affect, Normal Mood


Skin Exam: Warm, Dry, Intact, Normal Color, No Rash


Lymphatic: No Adenopathy





Course





- Vital Signs


Last Recorded V/S: 


                                Last Vital Signs











Temp  36.8 C   08/18/20 10:52


 


Pulse  101 H  08/18/20 10:52


 


Resp  18   08/18/20 10:52


 


BP  102/87   08/18/20 10:52


 


Pulse Ox  99   08/18/20 10:52














- Orders/Labs/Meds


Meds: 


Medications














Discontinued Medications














Generic Name Dose Route Start Last Admin





  Trade Name Abiola  PRN Reason Stop Dose Admin


 


Ketorolac Tromethamine  60 mg  08/18/20 10:53 





  Toradol  IM  08/18/20 10:54 





  ONETIME ONE  














Departure





- Departure


Time of Disposition: 10:56


Disposition: Home, Self-Care 01


Condition: Fair


Clinical Impression: 


Low back pain


Qualifiers:


 Chronicity: acute Back pain laterality: midline Sciatica presence: without 

sciatica Qualified Code(s): M54.5 - Low back pain








- Discharge Information


*PRESCRIPTION DRUG MONITORING PROGRAM REVIEWED*: Not Applicable


*COPY OF PRESCRIPTION DRUG MONITORING REPORT IN PATIENT ELAINA: Not Applicable


Instructions:  Acute Back Pain, Adult


Forms:  ED Department Discharge


Care Plan Goals: 


The patient was advised of the examination results during the visit. The patient

 was given an injection of Toradol (60 mg)  while in the ED. The patient was 

discharged with scripts for Toradol (10 mg) #20 to take 1 by mouth every 6 hours

 and Flexeril (10 mg) #20 to take 1 by mouth at bedtime as needed. If the 

patient has any additional symptoms or concerns, the patient should either 

return to the emergency department or visit her primary care facility. 





Sepsis Event Note (ED)





- Evaluation


Sepsis Screening Result: No Definite Risk





- Focused Exam


Vital Signs: 


                                   Vital Signs











  Temp Pulse Resp BP Pulse Ox


 


 08/18/20 10:52  36.8 C  101 H  18  102/87  99

## 2020-10-06 NOTE — OR
DATE:  10/06/2020

 

PROCEDURES:  Total colonoscopy, terminal ileoscopy, narrow band imaging, and

multiple pinch biopsies.

 

INSTRUMENT USED:  PCF-H190DL Olympus video colonoscope.

 

PREMEDICATIONS:  Fentanyl 100 mcg intravenous, Versed 4 mg intravenous.  Nasal

O2 cannula.

 

The procedure was done under pulse oximetry, BP recording, and cardiac

monitoring.

 

INDICATION:  The patient with history of Crohn's disease in the remote past with

chronic diarrhea recently, unexplained and not responsive to medical measures.

Colonoscopic examination is done for detection of any polypoid lesions and

removal, biopsies to be obtained for microscopic colitis, endoscopic hemostasis

therapy if needed.

 

DESCRIPTION OF PROCEDURE:  Initial rectal exam was unremarkable.  Rigid anoscopy

showed small internal hemorrhoids without bleeding from them.  The colonoscope

was passed with ease up to and beyond the ileocecal junction to visualize 
normal-

appearing terminal ileum.  NBI views and photographs were taken, multiple pinch

biopsies were obtained from the terminal ileum and sent for histopathology.

Photographs were also taken of the cecum.  No bleeding was noted from any of the

visualized areas at the commencement of the examination.  There was moderate

amount of fecal material scattered in the colon limiting visualization of

few areas.  Bowel preparation [place] scale 2 in the right and transverse colon,

1 in left colon, total score 5.  No stricture.  No vascular ectasia.  No large

isolated ulcerations seen.  No evidence of diffuse inflammatory bowel disease in

the form of friability, contact bleeding, or ulcerations.  Few scattered

diverticula were noted in the distal left colon.  Probing the proximal sides of

folds and flexures using adequate distention and clearing up the stool material,

withdrawal of the scope was made.  Multiple pinch biopsies were taken from the

ileocecal fold, mid transverse colon, mid descending colon, and rectosigmoid,

and sent for any histopathologic evidence of microscopic colitis.  No bleeding

was noted from any of the visualized areas at the completion of examination.

 

IMPRESSION:

1. Internal hemorrhoids.

2. Diverticulosis.

 

The patient tolerated the procedure well.

 

DD:  10/06/2020 07:45:01

DT:  10/06/2020 08:25:57

MODL

Job #:  [id number]

STEVEN

## 2021-08-25 NOTE — EDM.PDOC
ED HPI GENERAL MEDICAL PROBLEM





- General


Stated Complaint: [id number] FELL AND CUT HEAD


Time Seen by Provider: 21 08:16


Source of Information: Reports: Patient, RN, RN Notes Reviewed


History Limitations: Reports: No Limitations





- History of Present Illness


INITIAL COMMENTS - FREE TEXT/NARRATIVE: 


Jennifer is a 72 y/o female who presents to the ED via personal vehicle with 

complaints of laceration to her right, lateral eye following a fall.  The 

patient reports she fell while getting out of her bed at approximately 0300 this

morning; she notes she struck her head on the bed-side table.  She denies loss 

of consciousness, daily blood thinner use, or blood dyscrasias.  She denies 

headache or vision changes but notes she is dizzy.  She denies neck pain.  The 

patient denies alcohol, tobacco, or recreational drug use. 








- Related Data


                                    Allergies











Allergy/AdvReac Type Severity Reaction Status Date / Time


 


silver [From SilvaSorb] Allergy  Rash Verified 10/06/20 06:41


 


amoxicillin trihydrate AdvReac Mild Nausea Verified 10/06/20 06:41





[From Augmentin]     


 


codeine AdvReac Mild Vomiting Verified 10/06/20 06:41


 


potassium clavulanate AdvReac Mild Nausea Verified 10/06/20 06:41





[From Augmentin]     











Home Meds: 


                                    Home Meds





Amitriptyline [Elavil] 300 mg PO BEDTIME 12/15/14 [History]


Alendronate Sodium 70 mg PO .THURS 08/10/20 [History]


DULoxetine HCl [Duloxetine HCl] 30 mg PO DAILY 08/10/20 [History]


Enalapril Maleate 40 mg PO DAILY 08/10/20 [History]


Glycopyrrolate 2 mg PO BID 08/10/20 [History]


Levothyroxine [Synthroid] 100 mcg PO ACBREAKFAST 08/10/20 [History]


Multivit-Min/FA/Lycopen/Lutein [Sentry Senior Tablet] 1 tab PO ACBREAKFAST 

08/10/20 [History]


Piroxicam 20 mg PO DAILY 08/10/20 [History]


amLODIPine Besylate [Amlodipine Besylate] 10 mg PO DAILY 08/10/20 [History]


Acetaminophen [Acetaminophen Extra Strength] 500 mg PO Q6H PRN 10/01/20 

[History]


Diclofenac Sodium [Voltaren 1% Gel] 1 applic TOP ASDIRECTED PRN 10/01/20 

[History]











Past Medical History


HEENT History: Reports: Impaired Vision, Other (See Below)


Other HEENT History: deviated septum


Cardiovascular History: Reports: High Cholesterol, Hypertension, Other (See 

Below)


Other Cardiovascular History: DENIES HIGH CHOLESTEROL


Respiratory History: Reports: COPD


Other Respiratory History: DENIES COPD


Gastrointestinal History: Reports: Bowel Obstruction, Cholelithiasis, Other (See

 Below)


Other Gastrointestinal History: elevated LFTs.  HX OF CROHN'S DISEASE.  DENIES 

CROHN'S


Genitourinary History: Reports: Urinary Incontinence


OB/GYN History: Reports: Fibroids, Pregnancy


Musculoskeletal History: Reports: Arthritis, Fracture, Other (See Below)


Other Musculoskeletal History: ostopenea.  HX OF LEFT ANKLE FRACTURE


Neurological History: Reports: None


Psychiatric History: Reports: None


Endocrine/Metabolic History: Reports: Hypothyroidism, Obesity/BMI 30+, 

Osteoporosis


Hematologic History: Reports: Anemia, Blood Transfusion(s), Iron Deficiency, 

Other (See Below)


Other Hematologic History: iron deficiency anemia


Immunologic History: Reports: None


Oncologic (Cancer) History: Reports: None


Dermatologic History: Reports: None





- Infectious Disease History


Infectious Disease History: Reports: Chicken Pox, Measles, Mumps, Other (See 

Below)


Other Infectious Disease History: HX OF SEPSIS





- Past Surgical History


Head Surgeries/Procedures: Reports: None


HEENT Surgical History: Reports: Cataract Surgery, Naso-Sinus Surgery, 

Tonsillectomy, Other (See Below)


Other HEENT Surgeries/Procedures: deviated septum surgery in 


Cardiovascular Surgical History: Reports: None


Respiratory Surgical History: Reports: None


GI Surgical History: Reports: Bariatric Procedure, Cholecystectomy, Colonoscopy,

 Other (See Below)


Other GI Surgeries/Procedures: S/P ENDOSCOPIC SPHINEROTOMY.  S/P GASTRIC SLEEVE 

RESECTION


Female  Surgical History: Reports: Breast Biopsy, Hysterectomy


Endocrine Surgical History: Reports: None


Neurological Surgical History: Reports: Lumbar Spine, Sacral Spine


Musculoskeletal Surgical History: Reports: Arthroscopic Knee, Hip Replacement, 

Other (See Below)


Other Musculoskeletal Surgeries/Procedures:: rt tka.  RIGHT THUMB SURGERY


Oncologic Surgical History: Reports: None


Dermatological Surgical History: Reports: None





Social & Family History





- Family History


Family Medical History: No Pertinent Family History





- Caffeine Use


Caffeine Use: Reports: Coffee


Other Caffeine Use: COFFEE EVERY AM 4 CUPS, DIET PEPSI





- Living Situation & Occupation


Living situation: Reports: Single, Alone


Occupation: Retired





ED ROS GENERAL





- Review of Systems


Review Of Systems: Comprehensive ROS is negative, except as noted in HPI.





ED EXAM, HEAD INJURY





- Physical Exam


Exam: See Below


Exam Limited By: No Limitations


General Appearance: Alert, WD/WN, No Apparent Distress


Head: Normocephalic, Active Bleeding (1cm laceration to right lateral eye), 

Facial Ecchymosis (To right forehead extending into right parietal lobe), Facial

 Lacerations (To right lateral eye), Facial Swelling (To right forehead 

extending into right parietal lobe), Facial Tenderness (to right).  No: Sharif's

 Sign, Raccoon Eyes


Nexus Criteria: No: Posterior, Midline Cervical Tenderness, Evidence of 

Intoxication, Altered Level of Consciousness, Focal Neurological Deficit, 

Painful Distraction Injuries


Eyes: Bilateral Eye: EOMI, Nystagmus (Lateral gaze jerk), PERRL (3mm)


Ears: Normal External Exam, Normal Canal, Hearing Grossly Normal


Nose: Normal Inspection, Normal Mucousa, No Blood


Throat/Mouth: Normal Inspection, Normal Lips, Normal Teeth, Normal Gums, Normal 

Oropharynx, Normal Voice, No Airway Compromise


Neck: Non-Tender, Full Range of Motion, Normal Alignment, Normal Inspection, 

Other (No c-collar placed due to negative NEXUS criteria).  No: Painful Range of

 Motion, Paraspinous Muscle Tender, Spinous Processes Tender, Stiff Neck, 

Tenderness, Tender Lateral, Tender Midline


Respiratory: No Respiratory Distress, Lungs Clear, Normal Breath Sounds, No 

Accessory Muscle Use, Chest Non-Tender


Cardiovascular: Normal Peripheral Pulses, Regular Rate, Rhythm, No Edema, No 

Gallop, No JVD, No Murmur, No Rub


GI/Abdominal Exam: Normal Bowel Sounds, Soft, Non-Tender


 (Female) Exam: Deferred


Rectal (Female) Exam: Deferred


Back Exam: Normal Inspection, Full Range of Motion


Extremities: Normal Inspection, Normal Range of Motion, Non-Tender, No Pedal 

Edema, Normal Capillary Refill


Neurologic: CNs II-XII nml As Tested, No Motor/Sensory Deficits, Alert, Normal 

Mood/Affect, Oriented x 3


Skin: Warm/Dry, Ecchymosis (See above)





- [place] Coma Score


Best Eye Response ([place]): (4) Open Spontaneously


Best Verbal Response ([place]): (5) Oriented


Best Motor Response (Keli): (6) Obeys Commands





ED LACERATION/WOUND & LUDMILA PROC





- Laceration/Wound Repair


  ** Right Middle Lateral Face


Lac/wound length in cm: 1


Appearance: Superficial, Linear, Clean


Distal NVT: Neuro & Vascular Intact, No Tendon Injury


Anesthetic Type: Local


Local Anesthesia - Lidocaine (Xylocaine): 1% Plain


Local Anesthetic Volume: 5cc


Skin Prep: Chlorhexidine (Hibiciens), Saline, Sterile Drape


Saline irrigation (cc's): 10


Exploration/Debridement/Repair: Wound Explored, In a Bloodless Field, Explored 

to Base, No Foreign Material Found, Wound Margins Revised


Closed with: Sutures


Suture Size: 4-0


# of Sutures: 2


Suture Type: Prolene, Interrupted, Simple


Drain Placement: No


Sterile Dressing Applied: Nurse


Tetanus Status Addressed: Yes


Complications: No





Course





- Vital Signs


Last Recorded V/S: 


                                Last Vital Signs











Temp  98.7 F   21 08:31


 


Pulse  78   21 08:31


 


Resp  18   21 08:31


 


BP  116/60   21 08:31


 


Pulse Ox  99   21 08:31














- Orders/Labs/Meds


Orders: 


                               Active Orders 24 hr











 Category Date Time Status


 


 Vaccines to be Administered [RC] PER UNIT ROUTINE Care  21 09:37 Active











Meds: 


Medications














Discontinued Medications














Generic Name Dose Route Start Last Admin





  Trade Name Abiola  PRN Reason Stop Dose Admin


 


Diphtheria/Tetanus/Acell Pertussis  0.5 ml  21 09:37  21 09:54





  Diphtheria,Pertussis(Acell),Tetanus Vaccine 0.5 Ml Syringe  IM  21 09:38

  0.5 ml





  .ONCE ONE   Administration


 


Lidocaine HCl  30 ml  21 08:17  21 08:40





  Lidocaine 1% 30 Ml Sdv  INJECT  21 08:18  30 ml





  ONETIME ONE   Administration


 


Meclizine HCl  25 mg  21 09:59  21 10:05





  Meclizine 12.5 Mg Tab  PO  21 10:00  25 mg





  ONETIME ONE   Administration


 


Meclizine HCl  Confirm  21 10:01  21 10:05





  Meclizine 12.5 Mg Tab  Administered  21 10:02  Not Given





  Dose  





  12.5 mg  





  .ROUTE  





  .[facility]  














- Radiology Interpretation


Free Text/Narrative:: 


[facility] [place] [facility]


Final Radiology Report  Call: [phone]


assistance Online chat: [email or web address]


Name: JENNIFER SPAIN Age: 71Years F Date: 2021


MRN: [id number] SSN: -- : 1949


Study: CT HEAD WO CONT Requesting Physician: Vera Raman


Accession: [id number] Images: [id number]


Addl Studies:


Provided Clinical History: fall hit head at 0330


Contrast: Without Contrast Medium:


Contrast Amount: Contrast Method:


Page 1 of 2


PROCEDURE INFORMATION:


Exam: CT Head Without Contrast


Exam date and time: 2021 8:50 AM


Age: 71 years old


Clinical indication: Injury or trauma; Fall; Blunt trauma (contusions or 

hematomas); Consciousness


not specified; Additional info: Fall hit head at 0330


TECHNIQUE:


Imaging protocol: Computed tomography of the head without contrast.


Radiation optimization: All CT scans at this facility use at least one of these 

dose optimization


techniques: automated exposure control; mA and/or kV adjustment per patient size

 (includes targeted


exams where dose is matched to clinical indication); or iterative 

reconstruction.


COMPARISON:


CT Head wo Cont 8/10/2020 8:00 AM


FINDINGS:


Brain: No acute intracerebral abnormality or injury. No acute infarct or 

intracerebral bleed. Mild


generalized cerebral atrophy with patchy periventricular leukomalacia in both 

cerebral hemispheres,


consistent most likely with chronic underlying small vessel / microvascular 

ischemic disease. [place]


Stroke Program Early CT Score (ASPECTS score) = 10, negative for acute 

intracerebral infarct.


Cerebral ventricles: No ventriculomegaly.


Paranasal sinuses: Visualized sinuses are unremarkable. No fluid levels.


Mastoid air cells: Visualized mastoid air cells are well aerated.


Bones/joints: No underlying orbital or calvarial fractures identified, however.


Soft tissues: Soft tissue swelling and subcutaneous contusions are seen lateral 

to the right orbit and


in the posterior right parietal scalp.


IMPRESSION:


1. Soft tissue swelling and subcutaneous contusions are seen lateral to the 

right orbit and in the


posterior right parietal scalp.


2. No underlying orbital or calvarial fractures identified, however.


3. No acute intracerebral abnormality or injury. No acute infarct or 

intracerebral bleed.


4. Mild generalized cerebral atrophy with patchy periventricular leukomalacia in

 both cerebral


hemispheres, consistent most likely with chronic underlying small vessel / 

microvascular ischemic


disease.


5. [place] Stroke Program Early CT Score (ASPECTS score) = 10, negative for 

acute intracerebral


infarct.


Thank you for allowing us to participate in the care of your patient.


Dictated and Authenticated by: Perfecto Benito MD


2021 9:25 AM Central Time ([place] & [place])








- Re-Assessments/Exams


Free Text/Narrative Re-Assessment/Exam: 





21


Laceration to right lateral eye suture without complication.  Boostrix 

administered. 





Findings of examination and imaging reviewed with patient. Discussed supportive 

cares for laceration.  Red flag signs and symptoms which would warrant 

reevaluation reviewed.  Patient verbalized understanding and agreement with the 

plan of care.











Departure





- Departure


Time of Disposition: 09:47


Disposition: Home, Self-Care 01


Condition: Fair


Clinical Impression: 


 Fall from ground level





Laceration of face without complication


Qualifiers:


 Encounter type: initial encounter Qualified Code(s): S01.81XA - Laceration 

without foreign body of other part of head, initial encounter





Head injury due to trauma


Qualifiers:


 Encounter type: initial encounter Qualified Code(s): S09.90XA - Unspecified 

injury of head, initial encounter








- Discharge Information


*PRESCRIPTION DRUG MONITORING PROGRAM REVIEWED*: Not Applicable


*COPY OF PRESCRIPTION DRUG MONITORING REPORT IN PATIENT ELAINA: Not Applicable


Instructions:  Laceration Care, Adult, Easy-to-Read, Facial Laceration, 

Easy-to-Read


Forms:  ED Department Discharge


Additional Instructions: 


1.) Follow up with your primary care facility for suture removal in seven days. 


2.) Keep wound clean and dry; no reason to keep it covered if it is not 

draining. 


3.) Return to the emergency department with any severe headache, vision changes,

 excessive sleepiness, pupil changes, or seizure-like activity.  





Sepsis Event Note (ED)





- Focused Exam


Vital Signs: 


                                   Vital Signs











  Temp Pulse Resp BP Pulse Ox


 


 21 08:31  98.7 F  78  18  116/60  99














- My Orders


Last 24 Hours: 


My Active Orders





21 09:37


Vaccines to be Administered [RC] PER UNIT ROUTINE 














- Assessment/Plan


Last 24 Hours: 


My Active Orders





21 09:37


Vaccines to be Administered [RC] PER UNIT ROUTINE

## 2021-08-25 NOTE — CT
PROCEDURE INFORMATION: 

Exam: CT Head Without Contrast 

Exam date and time: 8/25/2021 8:50 AM 

Age: 71 years old 

Clinical indication: Injury or trauma; Fall; Blunt trauma (contusions or 

hematomas); Consciousness not specified; Additional info: Fall hit head at 0330 



TECHNIQUE: 

Imaging protocol: Computed tomography of the head without contrast. 

Radiation optimization: All CT scans at this facility use at least one of these 

dose optimization techniques: automated exposure control; mA and/or kV 

adjustment per patient size (includes targeted exams where dose is matched to 

clinical indication); or iterative reconstruction. 



COMPARISON: 

CT Head wo Cont 8/10/2020 8:00 AM 



FINDINGS: 

Brain: No acute intracerebral abnormality or injury. No acute infarct or 

intracerebral bleed. Mild generalized cerebral atrophy with patchy 

periventricular leukomalacia in both cerebral hemispheres, consistent most 

likely with chronic underlying small vessel / microvascular ischemic disease. 

[place] Stroke Program Early CT Score (ASPECTS score) = 10, negative for acute 

intracerebral infarct. 

Cerebral ventricles: No ventriculomegaly. 

Paranasal sinuses: Visualized sinuses are unremarkable. No fluid levels. 

Mastoid air cells: Visualized mastoid air cells are well aerated. 

Bones/joints: No underlying orbital or calvarial fractures identified, however. 

Soft tissues: Soft tissue swelling and subcutaneous contusions are seen lateral 

to the right orbit and in the posterior right parietal scalp. 



IMPRESSION: 



1. Soft tissue swelling and subcutaneous contusions are seen lateral to the 

right orbit and in the posterior right parietal scalp. 



2. No underlying orbital or calvarial fractures identified, however. 



3. No acute intracerebral abnormality or injury. No acute infarct or 

intracerebral bleed. 



4. Mild generalized cerebral atrophy with patchy periventricular leukomalacia 

in both cerebral hemispheres, consistent most likely with chronic underlying 

small vessel / microvascular ischemic disease. 



5. [place] Stroke Program Early CT Score (ASPECTS score) = 10, negative for 

acute intracerebral infarct.

## 2021-08-31 NOTE — EDM.PDOC
Addended by: CORNEL STERLING on: 9/25/2020 03:30 PM     Modules accepted: Level of Service     ED HPI GENERAL MEDICAL PROBLEM





- General


Chief Complaint: Back Pain or Injury


Stated Complaint: PAIN IN RIGHT LOWER BACK AND HIP


Time Seen by Provider: 08/31/21 11:00


Source of Information: Reports: Patient


History Limitations: Reports: No Limitations





- History of Present Illness


INITIAL COMMENTS - FREE TEXT/NARRATIVE: 





70 y/o F c/o R lower back and hip pn that started this morning. Pt is 8/10, 

sharp, non radiating. Pt has had surgery on her lumbar spine which has left pt 

with intermittent episodes of R lower back and hip pn. Pt reports she usually 

gets a Toradol shot and feels much better. Was seen here last week after falling

at home and sustaining a laceration lateral to her R eye. Denies recent injury, 

cp, db, abd pn, other extremity pn, drugs. 


Onset: Today


Duration: Hour(s):


Location: Reports: Lower Extremity, Right


Quality: Reports: Sharp


Severity: Severe


Improves with: Reports: Rest


Worsens with: Reports: Movement


  ** Right Hip


Pain Score (Numeric/FACES): 8





- Related Data


                                    Allergies











Allergy/AdvReac Type Severity Reaction Status Date / Time


 


silver [From SilvaSorb] Allergy  Rash Verified 08/31/21 11:10


 


amoxicillin trihydrate AdvReac Mild Nausea Verified 08/31/21 11:10





[From Augmentin]     


 


codeine AdvReac Mild Vomiting Verified 08/31/21 11:10


 


potassium clavulanate AdvReac Mild Nausea Verified 08/31/21 11:10





[From Augmentin]     











Home Meds: 


                                    Home Meds





Amitriptyline [Elavil] 300 mg PO BEDTIME 12/15/14 [History]


Alendronate Sodium 70 mg PO .THURS 08/10/20 [History]


DULoxetine HCl [Duloxetine HCl] 30 mg PO DAILY 08/10/20 [History]


Enalapril Maleate 40 mg PO DAILY 08/10/20 [History]


Glycopyrrolate 2 mg PO BID 08/10/20 [History]


Levothyroxine [Synthroid] 100 mcg PO ACBREAKFAST 08/10/20 [History]


Multivit-Min/FA/Lycopen/Lutein [Sentry Senior Tablet] 1 tab PO ACBREAKFAST 

08/10/20 [History]


Piroxicam 20 mg PO DAILY 08/10/20 [History]


amLODIPine Besylate [Amlodipine Besylate] 10 mg PO DAILY 08/10/20 [History]


Acetaminophen [Acetaminophen Extra Strength] 500 mg PO Q6H PRN 10/01/20 

[History]


Diclofenac Sodium [Voltaren 1% Gel] 1 applic TOP ASDIRECTED PRN 10/01/20 [His

tory]











Past Medical History


HEENT History: Reports: Impaired Vision, Other (See Below)


Other HEENT History: deviated septum


Cardiovascular History: Reports: High Cholesterol, Hypertension, Other (See 

Below)


Other Cardiovascular History: DENIES HIGH CHOLESTEROL


Respiratory History: Reports: COPD


Other Respiratory History: DENIES COPD


Gastrointestinal History: Reports: Bowel Obstruction, Cholelithiasis, Other (See

Below)


Other Gastrointestinal History: elevated LFTs.  HX OF CROHN'S DISEASE.  DENIES 

CROHN'S


Genitourinary History: Reports: Urinary Incontinence


OB/GYN History: Reports: Fibroids, Pregnancy


Musculoskeletal History: Reports: Arthritis, Fracture, Other (See Below)


Other Musculoskeletal History: ostopenea.  HX OF LEFT ANKLE FRACTURE


Neurological History: Reports: None


Psychiatric History: Reports: None


Endocrine/Metabolic History: Reports: Hypothyroidism, Obesity/BMI 30+, 

Osteoporosis


Hematologic History: Reports: Anemia, Blood Transfusion(s), Iron Deficiency, Ot

her (See Below)


Other Hematologic History: iron deficiency anemia


Immunologic History: Reports: None


Oncologic (Cancer) History: Reports: None


Dermatologic History: Reports: None





- Infectious Disease History


Infectious Disease History: Reports: Chicken Pox, Measles, Mumps, Other (See 

Below)


Other Infectious Disease History: HX OF SEPSIS





- Past Surgical History


Head Surgeries/Procedures: Reports: None


HEENT Surgical History: Reports: Cataract Surgery, Naso-Sinus Surgery, 

Tonsillectomy, Other (See Below)


Other HEENT Surgeries/Procedures: deviated septum surgery in 1990s


Cardiovascular Surgical History: Reports: None


Respiratory Surgical History: Reports: None


GI Surgical History: Reports: Bariatric Procedure, Cholecystectomy, Colonoscopy,

Other (See Below)


Other GI Surgeries/Procedures: S/P ENDOSCOPIC SPHINEROTOMY.  S/P GASTRIC SLEEVE 

RESECTION


Female  Surgical History: Reports: Breast Biopsy, Hysterectomy


Endocrine Surgical History: Reports: None


Neurological Surgical History: Reports: Lumbar Spine, Sacral Spine


Musculoskeletal Surgical History: Reports: Arthroscopic Knee, Hip Replacement, 

Other (See Below)


Other Musculoskeletal Surgeries/Procedures:: rt tka.  RIGHT THUMB SURGERY


Oncologic Surgical History: Reports: None


Dermatological Surgical History: Reports: None





Social & Family History





- Family History


Family Medical History: No Pertinent Family History





- Tobacco Use


Tobacco Use Status *Q: Current Every Day Tobacco User


Years of Tobacco use: 55


Packs/Tins Daily: 1





- Caffeine Use


Caffeine Use: Reports: Coffee


Other Caffeine Use: COFFEE EVERY AM 4 CUPS, DIET PEPSI





- Recreational Drug Use


Recreational Drug Use: No





- Living Situation & Occupation


Living situation: Reports: Single, Alone


Occupation: Retired





ED ROS GENERAL





- Review of Systems


Review Of Systems: Comprehensive ROS is negative, except as noted in HPI.





ED EXAM,LOWER BACK PAIN/INJURY





- Physical Exam


Exam: See Below


Head: Other (contusions to the R face with sutured laceration lateral to the R 

eye)


Neck: Supple, Non-Tender


Respiratory/Chest: Lungs Clear


Cardiovascular: Normal Peripheral Pulses


GI/Abdominal: Soft, Non-Tender


 (Female) Exam: Deferred


Rectal (Female) Exam: Deferred


Back Exam: Full Range of Motion, Other (tenderness over the R sacral iliac joint

and R hip.)


Extremities: Normal Range of Motion, No Pedal Edema


Neurological: Alert, Normal Mood/Affect, Normal Dorsiflexion, CN II-XII Intact, 

Normal Plantar Flexion, Normal Gait, Normal Reflexes, No Motor/Sensory Deficits,

Oriented x 3


Psychiatric: Normal Affect, Normal Mood


Skin Exam: Warm, Dry, Intact





Course





- Vital Signs


Last Recorded V/S: 


                                Last Vital Signs











Temp  98.2 F   08/31/21 11:07


 


Pulse  88   08/31/21 11:07


 


Resp  14   08/31/21 11:07


 


BP  143/92 H  08/31/21 11:07


 


Pulse Ox  94 L  08/31/21 11:07














- Orders/Labs/Meds


Meds: 


Medications














Discontinued Medications














Generic Name Dose Route Start Last Admin





  Trade Name Abiola  PRN Reason Stop Dose Admin


 


Ketorolac Tromethamine  30 mg  08/31/21 11:07  08/31/21 11:18





  Ketorolac 30 Mg/Ml Sdv  IM  08/31/21 11:08  30 mg





  ONETIME ONE   Administration














Departure





- Departure


Time of Disposition: 11:28


Disposition: Home, Self-Care 01


Condition: Good


Clinical Impression: 


 Acute exacerbation of chronic low back pain








- Discharge Information


*PRESCRIPTION DRUG MONITORING PROGRAM REVIEWED*: Not Applicable


*COPY OF PRESCRIPTION DRUG MONITORING REPORT IN PATIENT [facility]: Not Applicable


Instructions:  Chronic Back Pain, Easy-to-Read


Forms:  ED Department Discharge


Additional Instructions: 


Use Tylenol or Motrin for pain as needed. If any new symptoms or concerns 

develop contact your primary care facility or return to the ER.





Sepsis Event Note (ED)





- Focused Exam


Vital Signs: 


                                   Vital Signs











  Temp Pulse Resp BP Pulse Ox


 


 08/31/21 11:07  98.2 F  88  14  143/92 H  94 L

## 2021-09-13 NOTE — EDM.PDOC
<Owen Prado M - Last Filed: 09/13/21 18:15>





ED HPI GENERAL MEDICAL PROBLEM





- General


Stated Complaint: FELL


Time Seen by Provider: 09/13/21 17:50


Source of Information: Reports: Patient


History Limitations: Reports: No Limitations





- History of Present Illness


INITIAL COMMENTS - FREE TEXT/NARRATIVE: 





This 70 yo female patient reports to the ED due to intermittent dizziness (room 

is spinning), delayed verbal communication (knows what she wants to say, but has

trouble getting out the words) and her feet feeling heavy. The patient reports 

she has noticed these symptoms over the past 4 days, but did not get concerned 

until her symptoms were lasting throughout the day today. The patient reports 

she did fall and hit the right side of her face just prior to symptom onset. The

patient has not been seen for her current symptoms. The patient reports she has 

been trying to quit smoking (down to 3 cigarettes yesterday) and continues to 

have desire to quit. The patient reports she does walk with a walker for support

due to multiple falls. 


Onset: Gradual


Duration: Day(s):, Getting Worse, Intermittent


Location: Reports: Generalized


Quality: Reports: Other


Severity: Moderate


Improves with: Reports: None


Worsens with: Reports: None


Context: Reports: Activity


  ** Right Headache


Pain Score (Numeric/FACES): 2





- Related Data


                                    Allergies











Allergy/AdvReac Type Severity Reaction Status Date / Time


 


silver [From SilvaSorb] Allergy  Rash Verified 09/13/21 17:21


 


amoxicillin trihydrate AdvReac Mild Nausea Verified 09/13/21 17:21





[From Augmentin]     


 


codeine AdvReac Mild Vomiting Verified 09/13/21 17:21


 


potassium clavulanate AdvReac Mild Nausea Verified 09/13/21 17:21





[From Augmentin]     











Home Meds: 


                                    Home Meds





Amitriptyline [Elavil] 300 mg PO BEDTIME 12/15/14 [History]


Alendronate Sodium 70 mg PO .THURS 08/10/20 [History]


DULoxetine HCl [Duloxetine HCl] 30 mg PO DAILY 08/10/20 [History]


Enalapril Maleate 40 mg PO DAILY 08/10/20 [History]


Glycopyrrolate 2 mg PO BID 08/10/20 [History]


Levothyroxine [Synthroid] 100 mcg PO ACBREAKFAST 08/10/20 [History]


Multivit-Min/FA/Lycopen/Lutein [Sentry Senior Tablet] 1 tab PO ACBREAKFAST 

08/10/20 [History]


Piroxicam 20 mg PO DAILY 08/10/20 [History]


amLODIPine Besylate [Amlodipine Besylate] 10 mg PO DAILY 08/10/20 [History]


Acetaminophen [Acetaminophen Extra Strength] 500 mg PO Q6H PRN 10/01/20 

[History]


Diclofenac Sodium [Voltaren 1% Gel] 1 applic TOP ASDIRECTED PRN 10/01/20 

[History]











Past Medical History


HEENT History: Reports: Impaired Vision, Other (See Below)


Other HEENT History: deviated septum


Cardiovascular History: Reports: High Cholesterol, Hypertension, Other (See 

Below)


Other Cardiovascular History: DENIES HIGH CHOLESTEROL


Respiratory History: Reports: COPD


Other Respiratory History: DENIES COPD


Gastrointestinal History: Reports: Bowel Obstruction, Cholelithiasis, Other (See

 Below)


Other Gastrointestinal History: elevated LFTs.  HX OF CROHN'S DISEASE.  DENIES 

CROHN'S


Genitourinary History: Reports: Urinary Incontinence


OB/GYN History: Reports: Fibroids, Pregnancy


Musculoskeletal History: Reports: Arthritis, Fracture, Other (See Below)


Other Musculoskeletal History: ostopenea.  HX OF LEFT ANKLE FRACTURE


Neurological History: Reports: None


Psychiatric History: Reports: None


Endocrine/Metabolic History: Reports: Hypothyroidism, Obesity/BMI 30+, 

Osteoporosis


Hematologic History: Reports: Anemia, Blood Transfusion(s), Iron Deficiency, 

Other (See Below)


Other Hematologic History: iron deficiency anemia


Immunologic History: Reports: None


Oncologic (Cancer) History: Reports: None


Dermatologic History: Reports: None





- Infectious Disease History


Infectious Disease History: Reports: Chicken Pox, Measles, Mumps, Other (See 

Below)


Other Infectious Disease History: HX OF SEPSIS





- Past Surgical History


Head Surgeries/Procedures: Reports: None


HEENT Surgical History: Reports: Cataract Surgery, Naso-Sinus Surgery, 

Tonsillectomy, Other (See Below)


Other HEENT Surgeries/Procedures: deviated septum surgery in 1990s


Cardiovascular Surgical History: Reports: None


Respiratory Surgical History: Reports: None


GI Surgical History: Reports: Bariatric Procedure, Cholecystectomy, Colonoscopy,

 Other (See Below)


Other GI Surgeries/Procedures: S/P ENDOSCOPIC SPHINEROTOMY.  S/P GASTRIC SLEEVE 

RESECTION


Female  Surgical History: Reports: Breast Biopsy, Hysterectomy


Endocrine Surgical History: Reports: None


Neurological Surgical History: Reports: Lumbar Spine, Sacral Spine


Musculoskeletal Surgical History: Reports: Arthroscopic Knee, Hip Replacement, 

Other (See Below)


Other Musculoskeletal Surgeries/Procedures:: rt tka.  RIGHT THUMB SURGERY


Oncologic Surgical History: Reports: None


Dermatological Surgical History: Reports: None





Social & Family History





- Family History


Family Medical History: No Pertinent Family History





- Tobacco Use


Tobacco Use Status *Q: Current Some Day Tobacco User


Years of Tobacco use: 54


Packs/Tins Daily: 1





- Caffeine Use


Caffeine Use: Reports: Coffee


Other Caffeine Use: COFFEE EVERY AM 4 CUPS, DIET PEPSI





- Recreational Drug Use


Recreational Drug Use: No





- Living Situation & Occupation


Living situation: Reports: Single, Alone


Occupation: Retired





ED ROS GENERAL





- Review of Systems


Review Of Systems: Comprehensive ROS is negative, except as noted in HPI.





ED EXAM, NEURO





- Physical Exam


Exam: See Below


Exam Limited By: No Limitations


General Appearance: Alert, WD/WN, Moderate Distress


Eye Exam: Bilateral Eye: EOMI, Normal Inspection, PERRL


Ears: Normal External Exam, Normal Canal, Hearing Grossly Normal, Normal TMs


Nose: Normal Inspection, Normal Mucosa, No Blood


Throat/Mouth: Normal Inspection, Normal Lips, Normal Teeth, Normal Gums, Normal 

Oropharynx, Normal Voice, No Airway Compromise


Head Exam: Other (The patient dose have a contusion to her right cheek and an 

abrasion to her right face (lateral to eye) with no current bleeding)


Neck: Normal Inspection, Supple, Non-Tender, Full Range of Motion


Respiratory/Chest: No Respiratory Distress, Lungs Clear, Normal Breath Sounds, 

No Accessory Muscle Use, Chest Non-Tender


Cardiovascular: Normal Peripheral Pulses, Regular Rate, Rhythm, No Edema, No 

Gallop, No JVD, No Murmur, No Rub


GI/Abdominal: Normal Bowel Sounds, Soft, Non-Tender, No Organomegaly, No 

Distention, No Abnormal Bruit, No Mass


 (Female) Exam: Deferred


Rectal (Female) Exam: Deferred


Neurological: Alert, Normal Mood/Affect, Normal Dorsiflexion, CN II-XII Intact, 

Normal Plantar Flexion, Normal Gait, Normal Reflexes, No Motor/Sensory Deficits,

 Oriented x 3


Back Exam: Normal Inspection, Full Range of Motion, NT


Extremities: Normal Range of Motion, No Pedal Edema, Normal Capillary Refill, 

Other (The patient has multiple bruises to her upper extremities with several 

skin tears (right elbow, left wrist) with no current bleeding. )


Psychiatric: Normal Affect, Normal Mood


Skin Exam: Other (As described above, contusion to right cheek, multiple 

contusions to upper extremities with several skin tears. )


  ** #1 Interpretation


EKG Date: 09/13/21


Time: 17:22


Rhythm: NSR


Rate (Beats/Min): 79


Axis: Normal


P-Wave: Present


QRS: Normal


ST-T: Normal


QT: Normal


Comparison: No Change





Departure





- Departure


Disposition: Home, Self-Care 01


Clinical Impression: 


 Multiple contusions





Fall


Qualifiers:


 Encounter type: initial encounter Qualified Code(s): W19.XXXA - Unspecified 

fall, initial encounter








- Discharge Information


Instructions:  Contusion, Easy-to-Read


Referrals: 


PCP,None [Primary Care Provider] - 


Forms:  ED Department Discharge


Additional Instructions: 


change position slowly


use walker


clinic follow up this week


urgent follow up increased weakness change in vision, severe headache


monitor blood ressure one time daily and bring to clinic








<Marilynn Kyle - Last Filed: 09/14/21 05:58>





Course





- Vital Signs


Last Recorded V/S: 


                                Last Vital Signs











Temp  97.7 F   09/13/21 17:22


 


Pulse  80   09/13/21 17:22


 


Resp  16   09/13/21 17:22


 


BP  145/106 H  09/13/21 17:22


 


Pulse Ox  95   09/13/21 17:22














- Orders/Labs/Meds


Orders: 


                               Active Orders 24 hr











 Category Date Time Status


 


 CULTURE URINE [RM] Urgent Lab  09/13/21 18:50 Received











Labs: 


                                Laboratory Tests











  09/13/21 09/13/21 09/13/21 Range/Units





  17:50 17:50 18:50 


 


WBC  7.9    (5.0-10.0)  10^3/uL


 


RBC  4.38    (4.2-5.4)  10^6/uL


 


Hgb  13.8    (12.0-16.0)  g/dL


 


Hct  41.8    (37.0-47.0)  %


 


MCV  95.4    ()  fL


 


MCH  31.5    (27.0-34.0)  pg


 


MCHC  33.0    (33.0-35.0)  g/dL


 


Plt Count  200    (150-450)  10^3/uL


 


Neut % (Auto)  81.6 H    (42.2-75.2)  %


 


Lymph % (Auto)  9.2 L    (20.5-50.1)  %


 


Mono % (Auto)  7.8    (2-8)  %


 


Eos % (Auto)  1.3    (1.0-3.0)  %


 


Baso % (Auto)  0.1    (0.0-1.0)  %


 


Sodium   141   (136-145)  mmol/L


 


Potassium   3.9   (3.5-5.1)  mmol/L


 


Chloride   103   ()  mmol/L


 


Carbon Dioxide   32   (21-32)  mmol/L


 


Anion Gap   9.9   (7-13)  mEq/L


 


BUN   14   (7-18)  mg/dL


 


Creatinine   0.97   (0.55-1.02)  mg/dL


 


Est Cr Clr Drug Dosing   44.00   mL/min


 


Estimated GFR (MDRD)   57   


 


BUN/Creatinine Ratio   14.4   (No establ ref range)  


 


Glucose   84   (70-99)  mg/dL


 


Calcium   10.2 H D   (8.5-10.1)  mg/dL


 


Total Bilirubin   0.6   (0.2-1.0)  mg/dL


 


AST   30   (15-37)  U/L


 


ALT   28   (14-59)  U/L


 


Alkaline Phosphatase   88   ()  U/L


 


Total Protein   7.0   (6.4-8.2)  g/dL


 


Albumin   3.7   (3.4-5.0)  g/dL


 


Globulin   3.3   


 


Albumin/Globulin Ratio   1.1   


 


Urine Color    Yellow  (YELLOW)  


 


Urine Appearance    Cloudy  (CLEAR)  


 


Urine pH    7.0  (5.0-9.0)  


 


Ur Specific Gravity    1.020  (1.005-1.030)  


 


Urine Protein    Negative  (NEGATIVE)  


 


Urine Glucose (UA)    Negative  (NEGATIVE)  


 


Urine Ketones    Trace H  (NEGATIVE)  


 


Urine Occult Blood    Trace-intact H  (NEGATIVE)  


 


Urine Nitrite    Negative  (NEGATIVE)  


 


Urine Bilirubin    Negative  (NEGATIVE)  


 


Urine Urobilinogen    0.2  (0.2-1.0)  mg/dL


 


Ur Leukocyte Esterase    Trace H  (NEGATIVE)  


 


Urine RBC    0-5  (0-5)  /HPF


 


Urine WBC    10-20 H  (0-5/HPF)  /HPF


 


Ur Epithelial Cells    Occasional  (NOT SEEN)  /HPF


 


Amorphous Sediment    Few  (NOT SEEN)  /HPF


 


Urine Bacteria    Many H  (0-FEW/HPF)  /HPF


 


Urine Mucus    Not seen  (NOT SEEN)  /LPF


 


Ethyl Alcohol   < 3   (0)  mg/dL











Meds: 


Medications














Discontinued Medications














Generic Name Dose Route Start Last Admin





  Trade Name Alanq  PRN Reason Stop Dose Admin


 


Ketorolac Tromethamine  30 mg  09/13/21 19:30  09/13/21 19:37





  Ketorolac 30 Mg/Ml Sdv  IM  09/13/21 19:31  30 mg





  ONETIME ONE   Administration














Departure





- Departure


Time of Disposition: 19:26


Condition: Good





- Discharge Information


*PRESCRIPTION DRUG MONITORING PROGRAM REVIEWED*: No


*COPY OF PRESCRIPTION DRUG MONITORING REPORT IN PATIENT [facility]: No

## 2021-09-13 NOTE — CT
PROCEDURE INFORMATION: 

Exam: CT Head Without Contrast 

Exam date and time: 9/13/2021 5:36 PM 

Age: 71 years old 

Clinical indication: Dizziness; Additional info: Multiple falls with dizziness 



TECHNIQUE: 

Imaging protocol: Computed tomography of the head without contrast. 

Radiation optimization: All CT scans at this facility use at least one of these 

dose optimization techniques: automated exposure control; mA and/or kV 

adjustment per patient size (includes targeted exams where dose is matched to 

clinical indication); or iterative reconstruction. 



COMPARISON: 

CT Head wo Cont 8/25/2021 8:50 AM 



FINDINGS: 

Brain: There is mild diffuse parenchymal atrophy present. No acute hemorrhage. 

Chronic periventricular small vessel white matter disease. No mass effect. 

Cerebral ventricles: The ventricles are enlarged secondary to parenchymal 

volume loss. 

Paranasal sinuses: Visualized sinuses are unremarkable. No fluid levels. 

Mastoid air cells: Visualized mastoid air cells are well aerated. 

Bones/joints: Unremarkable. No acute fracture. 

Soft tissues: Unremarkable. 



IMPRESSION: 

No acute intracranial abnormality.

## 2021-10-02 NOTE — EDM.PDOC
ED HPI GENERAL MEDICAL PROBLEM





- General


Chief Complaint: Laceration


Stated Complaint: SMALL CUT HEAD


Time Seen by Provider: 10/02/21 15:51


Source of Information: Reports: Patient


History Limitations: Reports: No Limitations





- History of Present Illness


INITIAL COMMENTS - FREE TEXT/NARRATIVE: 





72 y/o f c/o head lac on the back of her head after tripping over her feet while

unpacking boxes in her new apartment. No loc. NO other complaints. Denies ha, 

vision prob, neck pn, cp, abd pn, pevlic pn, ctls pain, nausa, vomiting. 


Onset: Today


Duration: Hour(s):


Location: Reports: Head


  ** Posterior Head


Pain Score (Numeric/FACES): 1





- Related Data


                                    Allergies











Allergy/AdvReac Type Severity Reaction Status Date / Time


 


silver [From SilvaSorb] Allergy  Rash Verified 10/02/21 14:50


 


amoxicillin trihydrate AdvReac Mild Nausea Verified 10/02/21 14:50





[From Augmentin]     


 


codeine AdvReac Mild Vomiting Verified 10/02/21 14:50


 


potassium clavulanate AdvReac Mild Nausea Verified 10/02/21 14:50





[From Augmentin]     











Home Meds: 


                                    Home Meds





Amitriptyline [Elavil] 300 mg PO BEDTIME 12/15/14 [History]


Alendronate Sodium 70 mg PO .THURS 08/10/20 [History]


DULoxetine HCl [Duloxetine HCl] 30 mg PO DAILY 08/10/20 [History]


Enalapril Maleate 40 mg PO DAILY 08/10/20 [History]


Glycopyrrolate 2 mg PO BID 08/10/20 [History]


Levothyroxine [Synthroid] 100 mcg PO ACBREAKFAST 08/10/20 [History]


Multivit-Min/FA/Lycopen/Lutein [Sentry Senior Tablet] 1 tab PO ACBREAKFAST 

08/10/20 [History]


Piroxicam 20 mg PO DAILY 08/10/20 [History]


amLODIPine Besylate [Amlodipine Besylate] 10 mg PO DAILY 08/10/20 [History]


Acetaminophen [Acetaminophen Extra Strength] 500 mg PO Q6H PRN 10/01/20 

[History]


Diclofenac Sodium [Voltaren 1% Gel] 1 applic TOP ASDIRECTED PRN 10/01/20 

[History]











Past Medical History


HEENT History: Reports: Impaired Vision, Other (See Below)


Other HEENT History: deviated septum


Cardiovascular History: Reports: High Cholesterol, Hypertension, Other (See 

Below)


Other Cardiovascular History: DENIES HIGH CHOLESTEROL


Respiratory History: Reports: COPD


Other Respiratory History: DENIES COPD


Gastrointestinal History: Reports: Bowel Obstruction, Cholelithiasis, Other (See

 Below)


Other Gastrointestinal History: elevated LFTs.  HX OF CROHN'S DISEASE.  DENIES 

CROHN'S


Genitourinary History: Reports: Urinary Incontinence


OB/GYN History: Reports: Fibroids, Pregnancy


Musculoskeletal History: Reports: Arthritis, Fracture, Other (See Below)


Other Musculoskeletal History: ostopenea.  HX OF LEFT ANKLE FRACTURE


Neurological History: Reports: None


Psychiatric History: Reports: None


Endocrine/Metabolic History: Reports: Hypothyroidism, Obesity/BMI 30+, 

Osteoporosis


Hematologic History: Reports: Anemia, Blood Transfusion(s), Iron Deficiency, 

Other (See Below)


Other Hematologic History: iron deficiency anemia


Immunologic History: Reports: None


Oncologic (Cancer) History: Reports: None


Dermatologic History: Reports: None





- Infectious Disease History


Infectious Disease History: Reports: Chicken Pox, Measles, Mumps, Other (See 

Below)


Other Infectious Disease History: HX OF SEPSIS





- Past Surgical History


Head Surgeries/Procedures: Reports: None


HEENT Surgical History: Reports: Cataract Surgery, Naso-Sinus Surgery, 

Tonsillectomy, Other (See Below)


Other HEENT Surgeries/Procedures: deviated septum surgery in 1990s


Cardiovascular Surgical History: Reports: None


Respiratory Surgical History: Reports: None


GI Surgical History: Reports: Bariatric Procedure, Cholecystectomy, Colonoscopy,

 Other (See Below)


Other GI Surgeries/Procedures: S/P ENDOSCOPIC SPHINEROTOMY.  S/P GASTRIC SLEEVE 

RESECTION


Female  Surgical History: Reports: Breast Biopsy, Hysterectomy


Endocrine Surgical History: Reports: None


Neurological Surgical History: Reports: Lumbar Spine, Sacral Spine


Musculoskeletal Surgical History: Reports: Arthroscopic Knee, Hip Replacement, 

Other (See Below)


Other Musculoskeletal Surgeries/Procedures:: rt tka.  RIGHT THUMB SURGERY


Oncologic Surgical History: Reports: None


Dermatological Surgical History: Reports: None





Social & Family History





- Family History


Family Medical History: No Pertinent Family History





- Tobacco Use


Tobacco Use Status *Q: Current Every Day Tobacco User


Years of Tobacco use: 55


Packs/Tins Daily: 0.5


Second Hand Smoke Exposure: No





- Caffeine Use


Caffeine Use: Reports: Coffee, Soda, Tea


Other Caffeine Use: COFFEE EVERY AM 4 CUPS, DIET PEPSI





- Recreational Drug Use


Recreational Drug Use: No





- Living Situation & Occupation


Living situation: Reports: Single, Alone


Occupation: Retired





ED ROS GENERAL





- Review of Systems


Review Of Systems: Comprehensive ROS is negative, except as noted in HPI.





ED EXAM, SKIN/RASH


Exam: See Below


Exam Limited By: No Limitations


General Appearance: Alert


Ears: Normal External Exam, Normal Canal, Hearing Grossly Normal, Normal TMs


Nose: Other (dried blood in nares)


Throat/Mouth: Normal Inspection, Normal Lips, Normal Teeth, Normal Gums, Normal 

Oropharynx, Normal Voice, No Airway Compromise


Head: Other (veritcal 3 cm laceration to occiput neg bleeding. Notebale hematoma

 under lac)


Neck: Supple


Respiratory/Chest: No Respiratory Distress, Lungs Clear, Normal Breath Sounds, 

No Accessory Muscle Use, Chest Non-Tender


Cardiovascular: Normal Peripheral Pulses, Regular Rate, Rhythm, No Edema, No 

Gallop, No JVD, No Murmur, No Rub


GI/Abdominal: Soft, Non-Tender





ED SKIN PROCEDURES





- Laceration/Wound Repair


  ** Posterior Head


Appearance: Subcutaneous


Skin Prep: Saline


Exploration/Debridement/Repair: Wound Explored


Closed with: Melba


Lac/Wound length In cm: 3


# of Sutures: 4





Course





- Vital Signs


Last Recorded V/S: 


                                Last Vital Signs











Temp  97.4 F   10/02/21 14:51


 


Pulse  89   10/02/21 14:51


 


Resp  18   10/02/21 14:51


 


BP  112/80   10/02/21 14:51


 


Pulse Ox  93 L  10/02/21 14:51














- Orders/Labs/Meds


Meds: 


Medications














Discontinued Medications














Generic Name Dose Route Start Last Admin





  Trade Name Abiola  PRN Reason Stop Dose Admin


 


Lidocaine/Epinephrine  20 ml  10/02/21 15:50 





  Lidocaine 1% With Epinephrine 1:100,000 20 Ml Mdv  INJECT  10/02/21 15:51 





  ONETIME ONE  














Departure





- Departure


Time of Disposition: 16:03


Disposition: Home, Self-Care 01


Condition: Good


Clinical Impression: 


Laceration of head


Qualifiers:


 Encounter type: initial encounter Location of open wound of head: scalp Foreign

 body presence: without foreign body Qualified Code(s): S01.01XA - Laceration 

without foreign body of scalp, initial encounter








- Discharge Information


*PRESCRIPTION DRUG MONITORING PROGRAM REVIEWED*: Not Applicable


*COPY OF PRESCRIPTION DRUG MONITORING REPORT IN PATIENT ELAINA: Not Applicable


Instructions:  Laceration Care, Adult


Forms:  ED Department Discharge


Additional Instructions: 


Use tylenol and motrin for pain as needed. You can ice the back of your head to 

reduce swelling. Have staples taken out in 10 -14 days.





Sepsis Event Note (ED)





- Evaluation


Sepsis Screening Result: No Definite Risk





- Focused Exam


Vital Signs: 


                                   Vital Signs











  Temp Pulse Resp BP Pulse Ox


 


 10/02/21 14:51  97.4 F  89  18  112/80  93 L

## 2021-10-29 NOTE — EDM.PDOC
ED HPI GENERAL MEDICAL PROBLEM





- General


Chief Complaint: Headache


Stated Complaint: AMBULANCE


Time Seen by Provider: 10/29/21 14:25


Source of Information: Reports: Patient, EMS, Old Records, RN, RN Notes Reviewed


History Limitations: Reports: No Limitations





- History of Present Illness


INITIAL COMMENTS - FREE TEXT/NARRATIVE: 


Jennifer is a 71 y/o female who presents to the ED via [facility] with 

complaints of head injury following a ground level fall.  The patient reports 

she was walking outside of the bank when she tripped on the sidewalk and fell 

forward onto her right face.  The patient reports she has been experiencing 

frequent falls and is supposed to ambulate with the assistance of a FWW, which 

she did not use today.  She is scheduled to receive an MRI this week to further 

evaluate reasons for falling.  The patient denies daily blood thinner use or 

history of blood dyscrasias; she denies loss of consciousness during the event. 

She denies cervical point tenderness or neck pain with extension/flexion/lateral

rotation.  She denies vision changes, dizziness, lightheadedness, headache, or 

projectile vomiting.  








- Related Data


                                    Allergies











Allergy/AdvReac Type Severity Reaction Status Date / Time


 


silver [From SilvaSorb] Allergy  Rash Verified 10/29/21 14:30


 


amoxicillin trihydrate AdvReac Mild Nausea Verified 10/29/21 14:30





[From Augmentin]     


 


codeine AdvReac Mild Vomiting Verified 10/29/21 14:30


 


potassium clavulanate AdvReac Mild Nausea Verified 10/29/21 14:30





[From Augmentin]     











Home Meds: 


                                    Home Meds





Amitriptyline [Elavil] 300 mg PO BEDTIME 12/15/14 [History]


Alendronate Sodium 70 mg PO .THURS 08/10/20 [History]


DULoxetine HCl [Duloxetine HCl] 30 mg PO DAILY 08/10/20 [History]


Enalapril Maleate 40 mg PO DAILY 08/10/20 [History]


Glycopyrrolate 2 mg PO BID 08/10/20 [History]


Levothyroxine [Synthroid] 100 mcg PO ACBREAKFAST 08/10/20 [History]


Multivit-Min/FA/Lycopen/Lutein [Sentry Senior Tablet] 1 tab PO ACBREAKFAST 

08/10/20 [History]


Piroxicam 20 mg PO DAILY 08/10/20 [History]


amLODIPine Besylate [Amlodipine Besylate] 10 mg PO DAILY 08/10/20 [History]


Acetaminophen [Acetaminophen Extra Strength] 500 mg PO Q6H PRN 10/01/20 

[History]


Diclofenac Sodium [Voltaren 1% Gel] 1 applic TOP ASDIRECTED PRN 10/01/20 

[History]











Past Medical History


HEENT History: Reports: Impaired Vision, Other (See Below)


Other HEENT History: deviated septum


Cardiovascular History: Reports: High Cholesterol, Hypertension, Other (See 

Below)


Other Cardiovascular History: DENIES HIGH CHOLESTEROL


Respiratory History: Reports: COPD


Other Respiratory History: DENIES COPD


Gastrointestinal History: Reports: Bowel Obstruction, Cholelithiasis, Other (See

 Below)


Other Gastrointestinal History: elevated LFTs.  HX OF CROHN'S DISEASE.  DENIES 

CROHN'S


Genitourinary History: Reports: Urinary Incontinence


OB/GYN History: Reports: Fibroids, Pregnancy


Musculoskeletal History: Reports: Arthritis, Fracture, Other (See Below)


Other Musculoskeletal History: ostopenea.  HX OF LEFT ANKLE FRACTURE


Neurological History: Reports: None


Psychiatric History: Reports: None


Endocrine/Metabolic History: Reports: Hypothyroidism, Obesity/BMI 30+, 

Osteoporosis


Hematologic History: Reports: Anemia, Blood Transfusion(s), Iron Deficiency, 

Other (See Below)


Other Hematologic History: iron deficiency anemia


Immunologic History: Reports: None


Oncologic (Cancer) History: Reports: None


Dermatologic History: Reports: None





- Infectious Disease History


Infectious Disease History: Reports: Chicken Pox, Measles, Mumps, Other (See 

Below)


Other Infectious Disease History: HX OF SEPSIS





- Past Surgical History


Head Surgeries/Procedures: Reports: None


HEENT Surgical History: Reports: Cataract Surgery, Naso-Sinus Surgery, 

Tonsillectomy, Other (See Below)


Other HEENT Surgeries/Procedures: deviated septum surgery in 


Cardiovascular Surgical History: Reports: None


Respiratory Surgical History: Reports: None


GI Surgical History: Reports: Bariatric Procedure, Cholecystectomy, Colonoscopy,

 Other (See Below)


Other GI Surgeries/Procedures: S/P ENDOSCOPIC SPHINEROTOMY.  S/P GASTRIC SLEEVE 

RESECTION


Female  Surgical History: Reports: Breast Biopsy, Hysterectomy


Endocrine Surgical History: Reports: None


Neurological Surgical History: Reports: Lumbar Spine, Sacral Spine


Musculoskeletal Surgical History: Reports: Arthroscopic Knee, Hip Replacement, 

Other (See Below)


Other Musculoskeletal Surgeries/Procedures:: rt tka.  RIGHT THUMB SURGERY


Oncologic Surgical History: Reports: None


Dermatological Surgical History: Reports: None





Social & Family History





- Family History


Family Medical History: No Pertinent Family History





- Caffeine Use


Caffeine Use: Reports: Coffee, Soda, Tea


Other Caffeine Use: COFFEE EVERY AM 4 CUPS, DIET PEPSI





- Living Situation & Occupation


Living situation: Reports: Single, Alone


Occupation: Retired





ED ROS GENERAL





- Review of Systems


Review Of Systems: Comprehensive ROS is negative, except as noted in HPI.





ED EXAM, HEAD INJURY





- Physical Exam


Exam: See Below


Exam Limited By: No Limitations


General Appearance: Alert, No Apparent Distress, Other (Active bleeding from 

right lateral forhead)


Head: Normocephalic, Scalp Lacerations (Right frontal,lateral scalp, extendeing 

into forehead), Scalp Hematoma (Surrounding laceration), Facial Abrasions (To 

right cheek), Facial Ecchymosis (To right cheek), Facial Lacerations (To right 

lateral forhead and right inferior periorbital region), Facial Swelling, Facial 

Tenderness.  No: Sharif's Sign, Raccoon Eyes


Nexus Criteria: No: Posterior, Midline Cervical Tenderness, Evidence of 

Intoxication, Altered Level of Consciousness, Focal Neurological Deficit, 

Painful Distraction Injuries


Eyes: Bilateral Eye: EOMI, Normal Inspection, PERRL (3mm)


Ears: Normal External Exam, Normal Canal, Hearing Grossly Normal, Normal TMs.  

No: TM Blood, TM Perforation


Nose: Normal Inspection, Normal Mucousa, No Blood


Throat/Mouth: Normal Inspection, Normal Oropharynx, Normal Voice, No Airway 

Compromise


Neck: Non-Tender, Full Range of Motion, Normal Alignment, Normal Inspection.  

No: Paraspinous Muscle Tender, Spinous Processes Tender, Stiff Neck, Tenderness,

 Tender Lateral, Tender Midline


Respiratory: No Respiratory Distress, Lungs Clear, No Accessory Muscle Use, 

Chest Non-Tender.  No: Crackles, Rales, Rhonchi, Wheezing, Stridor


Cardiovascular: Normal Peripheral Pulses, Regular Rate, Rhythm, No Gallop, No 

Murmur, No Rub


GI/Abdominal Exam: Normal Bowel Sounds, Soft, Non-Tender, No Distention, No 

Abnormal Bruit, No Mass, Pelvis Stable


 (Female) Exam: Normal External Exam


Rectal (Female) Exam: Deferred


Back Exam: Normal Inspection, Full Range of Motion.  No: Muscle Spasm, 

Paraspinal Tenderness, Vertebral Tenderness


Extremities: Normal Inspection, Normal Range of Motion, Non-Tender, No Pedal 

Edema, Normal Capillary Refill


Neurologic: CNs II-XII nml As Tested, No Motor/Sensory Deficits, Alert, Normal 

Mood/Affect, Oriented x 3


Skin: Normal Color, Warm/Dry





- [place] Coma Score


Best Eye Response (Keli): (4) Open Spontaneously


Best Verbal Response (Keli): (5) Oriented


Best Motor Response (Keli): (6) Obeys Commands





ED LACERATION/WOUND & LUDMILA PROC





- Laceration/Wound Repair


  ** Right Upper Anterior Lateral Face


Lac/wound length in cm: 2


Appearance: Superficial, Linear, Clean


Distal NVT: Neuro & Vascular Intact, No Tendon Injury


Local Anesthesia - Lidocaine (Xylocaine): 1% with EPI


Local Anesthetic Volume: 5cc


Skin Prep: Chlorhexidine (Hibiciens), Saline, Sterile Drape


Saline irrigation (cc's): 15


Exploration/Debridement/Repair: Wound Explored, In a Bloodless Field, Explored 

to Base, Wound Margins Revised


Closed with: Sutures


Suture Size: 4-0


# of Sutures: 5


Suture Type: Prolene, Interrupted, Simple


Drain Placement: No


Sterile Dressing Applied: Nurse


Tetanus Status Addressed: Yes


Complications: No





  ** Right Middle Anterior Medial Face


Lac/wound length in cm: 0.7


Appearance: Superficial


Distal NVT: Neuro & Vascular Intact, No Tendon Injury


Skin Prep: Chlorhexidine (Hibiciens), Saline, Sterile Drape


Saline irrigation (cc's): 10


Exploration/Debridement/Repair: In a Bloodless Field, Explored to Base, Wound 

Margins Revised


Closed with: Steri-Strips (5)


Drain Placement: No


Sterile Dressing Applied: Nurse


Tetanus Status Addressed: Yes


Complications: No





Course





- Vital Signs


Last Recorded V/S: 


                                Last Vital Signs











Temp  97.5 F   10/29/21 14:20


 


Pulse  86   10/29/21 14:20


 


Resp  16   10/29/21 14:20


 


BP  132/81   10/29/21 14:20


 


Pulse Ox  97   10/29/21 14:20














- Orders/Labs/Meds


Labs: 


                                Laboratory Tests











  10/29/21 10/29/21 Range/Units





  15:03 15:03 


 


WBC  5.8   (5.0-10.0)  10^3/uL


 


RBC  4.13 L   (4.2-5.4)  10^6/uL


 


Hgb  13.2   (12.0-16.0)  g/dL


 


Hct  40.0   (37.0-47.0)  %


 


MCV  96.9   ()  fL


 


MCH  32.0   (27.0-34.0)  pg


 


MCHC  33.0   (33.0-35.0)  g/dL


 


Plt Count  217   (150-450)  10^3/uL


 


Neut % (Auto)  76.3 H   (42.2-75.2)  %


 


Lymph % (Auto)  14.0 L   (20.5-50.1)  %


 


Mono % (Auto)  8.1 H   (2-8)  %


 


Eos % (Auto)  1.4   (1.0-3.0)  %


 


Baso % (Auto)  0.2   (0.0-1.0)  %


 


Sodium   138  (136-145)  mmol/L


 


Potassium   4.2  (3.5-5.1)  mmol/L


 


Chloride   100  ()  mmol/L


 


Carbon Dioxide   32  (21-32)  mmol/L


 


Anion Gap   10.2  (7-13)  mEq/L


 


BUN   17  (7-18)  mg/dL


 


Creatinine   1.01  (0.55-1.02)  mg/dL


 


Est Cr Clr Drug Dosing   39.82  mL/min


 


Estimated GFR (MDRD)   54  


 


BUN/Creatinine Ratio   16.8  (No establ ref range)  


 


Glucose   91  (70-99)  mg/dL


 


Calcium   9.1  (8.5-10.1)  mg/dL


 


Total Bilirubin   0.4  (0.2-1.0)  mg/dL


 


AST   34  (15-37)  U/L


 


ALT   45  (14-59)  U/L


 


Alkaline Phosphatase   123 H  ()  U/L


 


Total Protein   7.2  (6.4-8.2)  g/dL


 


Albumin   3.7  (3.4-5.0)  g/dL


 


Globulin   3.5  


 


Albumin/Globulin Ratio   1.1  


 


Ethyl Alcohol   < 3  (0)  mg/dL











Meds: 


Medications














Discontinued Medications














Generic Name Dose Route Start Last Admin





  Trade Name Freq  PRN Reason Stop Dose Admin


 


Bacitracin  1 dose  10/29/21 16:01  10/29/21 16:10





  Bacitracin Oint 1 Gm U/D Packet  TOP  10/29/21 16:02  1 dose





  ONETIME ONE   Administration


 


Lidocaine/Epinephrine  20 ml  10/29/21 15:19  10/29/21 15:23





  Lidocaine 1% With Epinephrine 1:100,000 20 Ml Mdv  INJECT  10/29/21 15:20  20 

ml





  ONETIME ONE   Administration














- Radiology Interpretation


Free Text/Narrative:: 


[facility] [place] [facility]


Final Radiology Report  Call: [phone]


assistance Online chat: [email or web address]


Name: JENNIFER SPAIN Age: 72Years F Date: 10/29/2021


MRN: [id number] SSN: -- : [id number]


Study: CT HEAD WO CONT Requesting Physician: Vera Raman


Accession: [id number] Images: 187


Addl Studies:


Provided Clinical History: Fall from ground level, struck head


Contrast: Without Contrast Medium:


Contrast Amount: Contrast Method:


Page 1 of 2


PROCEDURE INFORMATION:


Exam: CT Head Without Contrast


Exam date and time: 10/29/2021 2:55 PM


Age: 72 years old


Clinical indication: Other: Fall from ground level, struck head


TECHNIQUE:


Imaging protocol: Computed tomography of the head without contrast.


Radiation optimization: All CT scans at this facility use at least one of these 

dose optimization


techniques: automated exposure control; mA and/or kV adjustment per patient size

 (includes targeted


exams where dose is matched to clinical indication); or iterative 

reconstruction.


COMPARISON:


CT Head wo Cont 2021 5:36 PM


FINDINGS:


Brain: No hemorrhage, mass effect or midline shift. Age-related atrophy and 

chronic white matter


ischemic changes, with no evidence of an acute intracranial abnormality.


Cerebral ventricles: No ventriculomegaly.


Paranasal sinuses: Visualized sinuses are unremarkable. No fluid levels.


Mastoid air cells: Visualized mastoid air cells are well aerated.


Bones/joints: No acute fracture.


Soft tissues: Left posterior parietal soft tissue hematoma noted.


IMPRESSION:


1. Left posterior parietal soft tissue hematoma noted.


2. No hemorrhage, mass effect or midline shift.


3. Age-related atrophy and chronic white matter ischemic changes, with no 

evidence of an acute


intracranial abnormality.


Thank you for allowing us to participate in the care of your patient.


Dictated and Authenticated by: Ramsey Olvera DO


10/29/2021 3:18 PM Central Time ([place] & [place])








- Re-Assessments/Exams


Free Text/Narrative Re-Assessment/Exam: 





10/29/21


CT head obtained.  Labs pending. 





Laceration to right scalp/face suture without complication. Findings of 

examination, lab work, and imaging reviewed with patient.  Supportive cares for 

laceration discussed.  Patient instructed to follow up with primary care 

provider in 7 days regarding todays visit.  Red flag signs and symptoms which 

would warrant immediate reevaluation reviewed.  Patient verbalized understanding

 and agreement with the plan of care.  








Departure





- Departure


Time of Disposition: 16:02


Disposition: Home, Self-Care 01


Condition: Fair


Clinical Impression: 


 Fall from ground level, Hematoma





Facial abrasion


Qualifiers:


 Encounter type: initial encounter Qualified Code(s): S00.81XA - Abrasion of 

other part of head, initial encounter





Laceration of face without complication


Qualifiers:


 Encounter type: initial encounter Qualified Code(s): S01.81XA - Laceration 

without foreign body of other part of head, initial encounter





Head injury due to trauma


Qualifiers:


 Encounter type: initial encounter Qualified Code(s): S09.90XA - Unspecified 

injury of head, initial encounter








- Discharge Information


*PRESCRIPTION DRUG MONITORING PROGRAM REVIEWED*: Not Applicable


*COPY OF PRESCRIPTION DRUG MONITORING REPORT IN PATIENT ELAINA: Not Applicable


Instructions:  Head Injury, Adult, Facial Laceration, Easy-to-Read


Referrals: 


PCP,None [Primary Care Provider] - 


Forms:  ED Department Discharge


Additional Instructions: 


1.) Follow up with any primary care facility for suture removal in seven days. 


2.) You may apply a cold compress to the affected areas as pain and swelling 

persist; 20 minutes, every hour. 


3.) You may take acetaminophen (Tylenol) 650-1000mg ever six hours, as pain 

persists. 


4.) Follow up with your primary care provider regarding today's visit; keep your

appointment for your MRI. 


5.) Return to the emergency department with any persistent headache, vision 

changes, projectile vomiting, or excessive sleepiness.

## 2021-10-29 NOTE — CT
PROCEDURE INFORMATION: 

Exam: CT Head Without Contrast 

Exam date and time: 10/29/2021 2:55 PM 

Age: 72 years old 

Clinical indication: Other: Fall from ground level, struck head 



TECHNIQUE: 

Imaging protocol: Computed tomography of the head without contrast. 

Radiation optimization: All CT scans at this facility use at least one of these 

dose optimization techniques: automated exposure control; mA and/or kV 

adjustment per patient size (includes targeted exams where dose is matched to 

clinical indication); or iterative reconstruction. 



COMPARISON: 

CT Head wo Cont 9/13/2021 5:36 PM 



FINDINGS: 

Brain: No hemorrhage, mass effect or midline shift. Age-related atrophy and 

chronic white matter ischemic changes, with no evidence of an acute 

intracranial abnormality. 

Cerebral ventricles: No ventriculomegaly. 

Paranasal sinuses: Visualized sinuses are unremarkable. No fluid levels. 

Mastoid air cells: Visualized mastoid air cells are well aerated. 

Bones/joints:  No acute fracture. 

Soft tissues: Left posterior parietal soft tissue hematoma noted. 



IMPRESSION: 

1. Left posterior parietal soft tissue hematoma noted. 

2. No hemorrhage, mass effect or midline shift. 

3. Age-related atrophy and chronic white matter ischemic changes, with no 

evidence of an acute intracranial abnormality.